# Patient Record
Sex: MALE | Race: WHITE | URBAN - METROPOLITAN AREA
[De-identification: names, ages, dates, MRNs, and addresses within clinical notes are randomized per-mention and may not be internally consistent; named-entity substitution may affect disease eponyms.]

---

## 2023-08-23 ENCOUNTER — INPATIENT (INPATIENT)
Facility: HOSPITAL | Age: 63
LOS: 3 days | Discharge: HOME | DRG: 301 | End: 2023-08-27
Attending: HOSPITALIST | Admitting: EMERGENCY MEDICINE
Payer: COMMERCIAL

## 2023-08-23 VITALS
SYSTOLIC BLOOD PRESSURE: 163 MMHG | DIASTOLIC BLOOD PRESSURE: 99 MMHG | OXYGEN SATURATION: 96 % | TEMPERATURE: 98 F | WEIGHT: 149.91 LBS | RESPIRATION RATE: 17 BRPM | HEART RATE: 80 BPM

## 2023-08-23 PROCEDURE — 99223 1ST HOSP IP/OBS HIGH 75: CPT

## 2023-08-23 PROCEDURE — 99285 EMERGENCY DEPT VISIT HI MDM: CPT | Mod: 25

## 2023-08-23 RX ORDER — ONDANSETRON 8 MG/1
4 TABLET, FILM COATED ORAL EVERY 8 HOURS
Refills: 0 | Status: DISCONTINUED | OUTPATIENT
Start: 2023-08-23 | End: 2023-08-27

## 2023-08-23 RX ORDER — ACETAMINOPHEN 500 MG
650 TABLET ORAL EVERY 6 HOURS
Refills: 0 | Status: DISCONTINUED | OUTPATIENT
Start: 2023-08-23 | End: 2023-08-27

## 2023-08-23 RX ORDER — SODIUM CHLORIDE 9 MG/ML
1000 INJECTION, SOLUTION INTRAVENOUS
Refills: 0 | Status: DISCONTINUED | OUTPATIENT
Start: 2023-08-23 | End: 2023-08-25

## 2023-08-23 RX ORDER — METOCLOPRAMIDE HCL 10 MG
10 TABLET ORAL ONCE
Refills: 0 | Status: COMPLETED | OUTPATIENT
Start: 2023-08-23 | End: 2023-08-23

## 2023-08-23 RX ORDER — LANOLIN ALCOHOL/MO/W.PET/CERES
3 CREAM (GRAM) TOPICAL AT BEDTIME
Refills: 0 | Status: DISCONTINUED | OUTPATIENT
Start: 2023-08-23 | End: 2023-08-27

## 2023-08-23 RX ORDER — ACETAMINOPHEN 500 MG
1000 TABLET ORAL ONCE
Refills: 0 | Status: COMPLETED | OUTPATIENT
Start: 2023-08-23 | End: 2023-08-23

## 2023-08-23 RX ADMIN — Medication 400 MILLIGRAM(S): at 23:41

## 2023-08-23 RX ADMIN — Medication 104 MILLIGRAM(S): at 23:41

## 2023-08-23 NOTE — ED ADULT NURSE NOTE - CHIEF COMPLAINT QUOTE
transfer from [place] with internal carotid dissection. c/o headache x 1 month and blurry vision x 2 months. received ASA and plavix from OSH transfer from [place] with internal carotid dissection. c/o headache x 1 month and blurry vision x 2 months. received ASA and plavix from OSH transfer from [place] with internal carotid dissection. c/o headache x 1 month and blurry vision x 2 months. received ASA and plavix from OSH

## 2023-08-23 NOTE — CHART NOTE - NSCHARTNOTEFT_GEN_A_CORE
64 YO M was accepted for transfer from Health system for observation. Pt was found to have right internal carotid artery dissection. He's initial complaint was dizziness. He reported onset of symptoms over for 3-4 weeks. He is in the rehab program for ETOH and Marijuana, reported last drink was 9 days ago. In [facility] he underwent MRI brain which did not show any acute strokes. Pt's reported NIHSS is 0. He was loaded with ASA 325mg and Plavix 300mg in the [place] ED.  - recommend admission to stepdown with Q2 neurochecks, if any neurological deterioration please contact stroke neurology  - continue ASA 81mg and Plavix 75mg daily  full consult to follow 64 YO M was accepted for transfer from [facility] for observation. Pt was found to have right internal carotid artery dissection. He's initial complaint was dizziness. He reported onset of symptoms over for 3-4 weeks. He is in the rehab program for ETOH and Marijuana, reported last drink was 9 days ago. In [facility] he underwent MRI brain which did not show any acute strokes. Pt's reported NIHSS is 0. He was loaded with ASA 325mg and Plavix 300mg in the [place] ED.  - recommend admission to stepdown with Q2 neurochecks, if any neurological deterioration please contact stroke neurology  - continue ASA 81mg and Plavix 75mg daily  full consult to follow 64 YO M was accepted for transfer from [facility] for observation. Pt was found to have right internal carotid artery dissection. He's initial complaint was dizziness. He reported onset of symptoms over for 3-4 weeks. He is in the rehab program for ETOH and Marijuana, reported last drink was 9 days ago. In [facility] he underwent MRI brain which did not show any acute strokes. Pt's reported NIHSS is 0. He was loaded with ASA 325mg and Plavix 300mg in the [place] ED.  - recommend admission to stepdown with Q2 neurochecks, if any neurological deterioration please contact stroke neurology  - continue ASA 81mg and Plavix 75mg daily  full consult to follow

## 2023-08-23 NOTE — ED ADULT TRIAGE NOTE - CHIEF COMPLAINT QUOTE
transfer from [place] with internal carotid dissection. c/o headache x 1 month and blurry vision x 2 months. received ASA and plavix from OSH transfer from [place] with internal carotid dissection. c/o headache x 1 month and blurry vision x 2 months. received ASA and plavix from OSH transfer from [place] with internal carotid dissection. c/o headache x 1 month and blurry vision x 2 months. received ASA and plavix from OSH

## 2023-08-23 NOTE — ED ADULT TRIAGE NOTE - NSWEIGHTCALCTOOLDRUG_GEN_A_CORE
----- Message from Dinesh Ballard MD sent at 7/12/2018  9:28 AM CDT -----  Please call and schedule patient to see me next week.    Thanks,  Dr. Ballard       used

## 2023-08-23 NOTE — ED PROVIDER NOTE - CLINICAL SUMMARY MEDICAL DECISION MAKING FREE TEXT BOX
63y male w/ pmh of anxiety transferred from [place] for an internal carotid dissection. Patient has been having a right sided migraine for the past month. It is associated with disequilibrium and lightheadedness. Patient denies LOC or falls, denies weakness, numbness, vision problems, or speech difficulties. He also noticed right eyelid drooping two weeks ago. He is currently in a detox program for alcohol and marijuana. His last drink was 9 days ago. Denies any drug use. 63y male w/ pmh of anxiety transferred from [place] for an internal carotid dissection. Patient has been having a right sided migraine for the past month. It is associated with disequilibrium and lightheadedness. Patient denies LOC or falls, denies weakness, numbness, vision problems, or speech difficulties. He also noticed right eyelid drooping two weeks ago. He is currently in a detox program for alcohol and marijuana. His last drink was 9 days ago. Denies any drug use. 63y male w/ pmh of anxiety transferred from [place] for an internal carotid dissection. Patient has been having a right sided migraine for the past month. It is associated with disequilibrium and lightheadedness. Patient denies LOC or falls, denies weakness, numbness, vision problems, or speech difficulties. He also noticed right eyelid drooping two weeks ago. He is currently in a detox program for alcohol and marijuana. His last drink was 9 days ago. Denies any drug use. 63y male w/ pmh of anxiety transferred from [place] for an internal carotid dissection. Patient has been having a right sided migraine for the past month. It is associated with disequilibrium and lightheadedness. Patient denies LOC or falls, denies weakness, numbness, vision problems, or speech difficulties. He also noticed right eyelid drooping two weeks ago. He is currently in a detox program for alcohol and marijuana. His last drink was 9 days ago. Denies any drug use. Case d/w Dr. Bright and requesting pt be admitted to with q2h neuro checks.  Case d/w Hospitalist and will admit 63y male w/ pmh of anxiety transferred from [place] for an internal carotid dissection. Patient has been having a right sided migraine for the past month. It is associated with disequilibrium and lightheadedness. Patient denies LOC or falls, denies weakness, numbness, vision problems, or speech difficulties. He also noticed right eyelid drooping two weeks ago. He is currently in a detox program for alcohol and marijuana. His last drink was 9 days ago. Denies any drug use. Case d/w Dr. Bright and requesting pt be admitted to with q2h neuro checks.  Case d/w Hospitalist and will admit 63y male w/ pmh of anxiety transferred from [place] for an internal carotid dissection. Patient has been having a right sided migraine for the past month. It is associated with disequilibrium and lightheadedness. Patient denies LOC or falls, denies weakness, numbness, vision problems, or speech difficulties. He also noticed right eyelid drooping two weeks ago. He is currently in a detox program for alcohol and marijuana. His last drink was 9 days ago. Denies any drug use. Case d/w Dr. Bright and requesting pt be admitted to with q2h neuro checks.  Case d/w Hospitalist and will admit

## 2023-08-23 NOTE — H&P ADULT - ASSESSMENT
ASSESSMENT:  63M with PMHX MDD, Anxiety, ?Bipolar, BPH, Tobacco/THC Abuse, ETOH Abuse s/p [facility] x9 days presents to [facility] c/o HA and Blurry Vision admitted for R ICA Dissection also found to have episode of Hypoglycemia and Epigastric Pain/Dilated Pancreatic Duct.    PLAN:  R ICA Dissection  -NIHSS 0  -Admit to SDU  -Neurochecks q2  -Repeat Labs  -IVF LR 75cc/hr  -Goal SBP <140   -Hydralazine 5mg IV PRN SBP >140. Avoid BB with Bradycardia.  -ASA 325mg PO and Plavix 300mg x1 Loaded at [facility]  -Continue ASA 81mg q24  -Plavix 75mg q24  -No need for heparin gtt given symptoms >1 month duration per Neuro IR   -NPO pending Neuro IR eval  -VTE PPX SCD/SQH  -Neuro IR consulted appreciate reccs re-eval in AM    Bradycardia  -Monitor Telemetry. Check EKG.  -Hydralazine 5mg PRN SBP >140  -Avoid labetalol with bradycardia  -BP stable. Asymptomatic. Monitor VS q2    Hypoglycemia  -One episode at [facility] resolved PTA  -Accuchecks q4    Incidental Dilated Pancreatic Duct r/o Pancreatitis v Underlying Pathology  -Seen on CTAP from [facility] 8/23  -Check LFTs/Lipase  -Defer MRCP to GI  -GI Consulted    Incidental Nodular Opacity  -1.1cm nodular opactiy on ct Abd/Pel  -Outpatient F/u for repeat CT Chest on discharge    MDD, Anxiety, ?Bipolar Disorder  -Hydroxyzine 50mg BID PRN Anxiety  -Quetiapine 25mg qAM + 100mg qPM  -Trazodone 50mg qHS    BPH  -Flomax 0.4mg qHS    ETOH Abuse, THC Abuse, Tobacco Abuse  -Last drink 9 days ago  -s/p [facility]. Unclear if patient was on treatment taper.  -No signs/symptoms of withdrawal at this time. Monitor closely in case repeat CIWA protocol required.  - Cessation ASSESSMENT:  63M with PMHX MDD, Anxiety, ?Bipolar, BPH, Tobacco/THC Abuse, ETOH Abuse s/p [facility] x9 days [place] [facility] [place] and Blurry Vision admitted for R ICA Dissection also found to have episode of Hypoglycemia and Epigastric Pain/Dilated Pancreatic Duct.    PLAN:  R ICA Dissection  -NIHSS 0  -Admit to SDU  -Neurochecks q2  -Repeat Labs  -IVF LR 75cc/hr  -Goal SBP <140   -Hydralazine 5mg IV PRN SBP >140. Avoid BB with Bradycardia.  -ASA 325mg PO and Plavix 300mg x1 [place] [facility]  -Continue ASA 81mg q24  -Plavix 75mg q24  -No need for heparin gtt given symptoms >1 month duration per Neuro IR   -NPO pending Neuro IR eval  -VTE PPX SCD/SQH  -Neuro IR consulted appreciate reccs re-eval in AM    Bradycardia  -Monitor Telemetry. Check EKG.  -Hydralazine 5mg PRN SBP >140  -Avoid labetalol with bradycardia  -BP stable. Asymptomatic. Monitor VS q2    Hypoglycemia  -One episode [place] [facility] [place]TA  -Accuchecks q4    Incidental Dilated Pancreatic Duct r/o Pancreatitis v Underlying Pathology  -Seen [place] [facility] [place]  -Check LFTs/Lipase  -Defer MRCP to GI  -GI Consulted    Incidental Nodular Opacity  -1.1cm nodular opactiy on ct Abd/Pel  -Outpatient F/u for repeat CT Chest on discharge    MDD, Anxiety, ?Bipolar Disorder  -Hydroxyzine 50mg BID PRN Anxiety  -Quetiapine 25mg qAM + 100mg qPM  -Trazodone 50mg qHS    BPH  -Flomax 0.4mg qHS    ETOH Abuse, THC Abuse, Tobacco Abuse  -Last drink 9 days ago  -s/p [facility]. Unclear if patient was on treatment taper.  -No signs/symptoms of withdrawal at this time. Monitor closely in case repeat CIWA protocol required.  - Cessation ASSESSMENT:  63M with PMHX MDD, Anxiety, ?Bipolar, BPH, Tobacco/THC Abuse, ETOH Abuse s/p [facility] x9 days presents to [facility] c/o HA and Blurry Vision admitted for R ICA Dissection also found to have episode of Hypoglycemia and Epigastric Pain/Dilated Pancreatic Duct.    PLAN:  R ICA Dissection  -NIHSS 0  -Admit to SDU  -Neurochecks q2  -Repeat Labs  -IVF LR 75cc/hr  -Goal SBP <140   -Hydralazine 5mg IV PRN SBP >140. Avoid BB with Bradycardia.  -ASA 325mg PO and Plavix 300mg x1 Loaded at [facility]  -Continue ASA 81mg q24  -Plavix 75mg q24  -No need for heparin gtt given symptoms >1 month duration per Neuro IR   -NPO pending Neuro IR eval  -VTE PPX SCD/SQH  -Neuro IR consulted appreciate reccs re-eval in AM    Bradycardia  -Monitor Telemetry. Check EKG.  -Hydralazine 5mg PRN SBP >140  -Avoid labetalol with bradycardia  -BP stable. Asymptomatic. Monitor VS q2    Hypoglycemia  -One episode at [facility] resolved PTA  -Accuchecks q4    Incidental Dilated Pancreatic Duct r/o Pancreatitis v Underlying Pathology  -Seen on CTAP from [facility] 8/23  -Check LFTs/Lipase  -Defer MRCP to GI  -GI Consulted    Incidental Nodular Opacity  -1.1cm nodular opactiy on ct Abd/Pel  -Outpatient F/u for repeat CT Chest on discharge    MDD, Anxiety, ?Bipolar Disorder  -Hydroxyzine 50mg BID PRN Anxiety  -Quetiapine 25mg qAM + 100mg qPM  -Trazodone 50mg qHS    BPH  -Flomax 0.4mg qHS    ETOH Abuse, THC Abuse, Tobacco Abuse  -Last drink 9 days ago  -s/p [facility]. Unclear if patient was on treatment taper.  -No signs/symptoms of withdrawal at this time. Monitor closely in case repeat CIWA protocol required.  - Cessation

## 2023-08-23 NOTE — ED ADULT TRIAGE NOTE - NS ED NURSE AMBULANCES
[facility]

## 2023-08-23 NOTE — H&P ADULT - NSHPPHYSICALEXAM_GEN_ALL_CORE
Vital Signs Last 24 Hrs  T(C): 36.8 (23 Aug 2023 22:35), Max: 36.8 (23 Aug 2023 22:35)  T(F): 98.2 (23 Aug 2023 22:35), Max: 98.2 (23 Aug 2023 22:35)  HR: 56 (24 Aug 2023 00:22) (49 - 80)  BP: 133/93 (24 Aug 2023 00:22) (133/93 - 163/99)  BP(mean): --  RR: 16 (23 Aug 2023 23:08) (16 - 17)  SpO2: 96% (23 Aug 2023 23:08) (96% - 96%)    Parameters below as of 23 Aug 2023 23:08  Patient On (Oxygen Delivery Method): room air    Constitutional: Well-appearing, NAD, VSS  Head: NC/AT  Eyes: PERRL, EOMI, anicteric sclera, conjunctiva WNL  ENT: Normal Pharynx, MMM, No tonsillar exudate/erythema  Neck: Supple, Non-tender  Chest: Non-tender, no rashes  Cardio: +Bradycardia, s1/s2, no appreciable murmurs/rubs/gallops  Resp: BS CTA bilaterally, no wheezing/rhonchi/rales  Abd: Soft, Non-tender, Non-distended, no rebound/guarding/rigidity  : not examined  Rectal: not examined  MSK: moving all extremities, no motor weakness, full ROM x4  Ext: palpable distal pulses, good capillary refill, no clubbing/cyanosis/edema  Psych: appropriate, cooperative  Neuro: CN II-XII grossly intact, no focal deficits, NIHSS 0  Skin: Warm/Dry. No rashes.

## 2023-08-23 NOTE — ED PROVIDER NOTE - PHYSICAL EXAMINATION
General: Awake, alert, lying in bed in NAD  HEENT: Normocephalic, atraumatic. No scleral icterus or conjunctival injection. EOMI. Moist mucous membranes. Oropharynx clear.   Neck:. Soft and supple.  Cardiac: RRR, Peripheral pulses 2+ and symmetric. No LE edema.  Resp: Lungs CTAB. No accessory muscle use  Abd: Soft, non-tender, non-distended. No guarding, rebound, or rigidity.  Back: Spine midline and non-tender.   Skin: No rashes, abrasions, or lacerations.  Neuro: AO x 4. Moves all extremities symmetrically. Motor strength and sensation grossly intact. CN II-XII intact. 5/5 strength in all extremities. No dysmetria on finger to nose. right sided ptosis.   Psych: Appropriate mood and affect

## 2023-08-23 NOTE — ED PROVIDER NOTE - ATTENDING CONTRIBUTION TO CARE
62 yo M [place] [facility] [place] with c/o R sided headache for a month with assoc dizziness.  CTA demonstrated a right ICA dissection and pt was sent to [facility] for eval by Neuro IR/Dr. Bright. Pt was started on ASA and Plavix PTA.  In ED pt awake and alert, thin appearing in NAD, PERRL, EOMI, mm moist, Neck supple, Cor Reg, Lungs clear b/l, abd soft, NT, Ext FROM, Neuro CN 2-12 intact MS 5/5 UE/LE's.  Case d/w Dr. Bright and requesting pt be admitted to with q2h neuro checks.  Case d/w Hospitalist and will admit 62 yo M [facility] after presenting with c/o R sided headache for a month with assoc dizziness.  CTA demonstrated a right ICA dissection and pt was sent to [facility] for eval by Neuro IR/Dr. Bright. Pt was started on ASA and Plavix PTA.  In ED pt awake and alert, thin appearing in NAD, PERRL, EOMI, mm moist, Neck supple, Cor Reg, Lungs clear b/l, abd soft, NT, Ext FROM, Neuro CN 2-12 intact MS 5/5 UE/LE's.  Case d/w Dr. Bright and requesting pt be admitted to with q2h neuro checks.  Case d/w Hospitalist and will admit 64 yo M transferred from [facility] after presenting with c/o R sided headache for a month with assoc dizziness.  CTA demonstrated a right ICA dissection and pt was sent to [facility] for eval by Neuro IR/Dr. Bright. Pt was started on ASA and Plavix PTA.  In ED pt awake and alert, thin appearing in NAD, PERRL, EOMI, mm moist, Neck supple, Cor Reg, Lungs clear b/l, abd soft, NT, Ext FROM, Neuro CN 2-12 intact MS 5/5 UE/LE's.  Case d/w Dr. Bright and requesting pt be admitted to with q2h neuro checks.  Case d/w Hospitalist and will admit

## 2023-08-23 NOTE — ED PROVIDER NOTE - NS ED ROS FT
General: Denies fever, chills  HEENT: Denies sore throat  Neck: Denies neck pain  Resp: Denies coughing, SOB  Cardiovascular: Denies CP, palpitations, LE edema  GI: Denies nausea, vomiting, abdominal pain, diarrhea, constipation, blood in stool  : Denies dysuria, hematuria  MSK: Denies back pain  Neuro: Denies numbness, weakness  Skin: Denies rashes.

## 2023-08-23 NOTE — H&P ADULT - HISTORY OF PRESENT ILLNESS
Patient seen/examined prior to [facility] on 8/23/23.    63M with PMHX MDD, Anxiety, ?Bipolar, BPH, Tobacco/THC Abuse, ETOH Abuse s/p [facility] x9 days presents to [facility] c/o HA and Blurry Vision. CTH negative. CTA Head/Neck concerning for possible R ICA Dissection. MRI Brain, MRA Head/Neck performed at [facility] confirming R ICA Dissection. [place] [facility] and [place] [facility] for Neuro IR eval. Also noted to have episode of hypoglycemia at [facility] which resolved and was also c/o epigastric abd pain with nausea for which CT ABD/PEL revealed dilated pancreatic duct. No current abd pain or N/V/D. Denies F/C. HA and Blurry vision improving. No weakness/dizziness. [facility] ED and [facility] ED discussed case with [facility] MICU and [facility] Neuro IR. No other complaints.    ROS negative unless mentioned.    PMHX: MDD, ?Bipolar, BPH, Tobacco/THC Abuse, ETOH Abuse   PSHX: None  Social Hx: +ETOH Abuse, THC Abuse, Tobacco Abuse  FamHx: +FamHx Brain Aneurysm reported Patient seen/examined prior to [facility] on 8/23/23.    63M with PMHX MDD, Anxiety, ?Bipolar, BPH, Tobacco/THC Abuse, ETOH Abuse s/p [facility] x9 days presents to [facility] c/o HA and Blurry Vision. CTH negative. CTA Head/Neck concerning for possible R ICA Dissection. MRI Brain, MRA Head/Neck performed at [facility] confirming R ICA Dissection. Patient loaded with ASA/Plavix at [facility] and transferred to [facility] for Neuro IR eval. Also noted to have episode of hypoglycemia at [facility] which resolved and was also c/o epigastric abd pain with nausea for which CT ABD/PEL revealed dilated pancreatic duct. No current abd pain or N/V/D. Denies F/C. HA and Blurry vision improving. No weakness/dizziness. [facility] ED and [facility] ED discussed case with [facility] MICU and [facility] Neuro IR. No other complaints.    ROS negative unless mentioned.    PMHX: MDD, ?Bipolar, BPH, Tobacco/THC Abuse, ETOH Abuse   PSHX: None  Social Hx: +ETOH Abuse, THC Abuse, Tobacco Abuse  FamHx: +FamHx Brain Aneurysm reported Patient seen/examined prior to [facility] on 8/23/23.    63M with PMHX MDD, Anxiety, ?Bipolar, BPH, Tobacco/THC Abuse, ETOH Abuse s/p [facility] x9 days presents to [facility] c/o HA and Blurry Vision. CTH negative. CTA Head/Neck concerning for possible R ICA Dissection. MRI Brain, MRA Head/Neck performed at [facility] confirming R ICA Dissection. Patient loaded with ASA/Plavix at [facility] and transferred to [facility] for Neuro IR eval. Also noted to have episode of hypoglycemia at [facility] which resolved and was also c/o epigastric abd pain with nausea for which CT ABD/PEL revealed dilated pancreatic duct. No current abd pain or N/V/D. Denies F/C. HA and Blurry vision improving. No weakness/dizziness. [facility] ED and [facility] ED discussed case with [facility] MICU and [facility] Neuro IR. No other complaints.    ROS negative unless mentioned.    PMHX: MDD, ?Bipolar, BPH, Tobacco/THC Abuse, ETOH Abuse   PSHX: None  Social Hx: +ETOH Abuse, THC Abuse, Tobacco Abuse  FamHx: +FamHx Brain Aneurysm reported

## 2023-08-23 NOTE — H&P ADULT - NSHPLABSRESULTS_GEN_ALL_CORE
CXR 8/23/23 [facility]   IMPRESSION: Negative chest.    CT ABD/PELVIS 8/23 [facility] IMPRESSION:  Diffuse bladder wall thickening. Correlate with urinalysis for cystitis.  Appendix is not definitely visualized. However, evaluation is markedly limited without oral or intravenous contrast. Repeat CT with oral contrast is recommended if there is clinical concern for acute appendicitis.  Prominence of the pancreas with mild dilatation of the main pancreatic duct. Follow-up MRI/MRCP is recommended.  There is a 1.1 cm nodular opacity at the right lung base. Follow-up dedicated chest CT is recommended for further evaluation.    CT Head WO 8/23/23 [facility]   IMPRESSION: No acute hemorrhage mass or mass effect.    CTA Brain/Neck 8/23 [facility] IMPRESSION:  CTA Neck: Findings most compatible with right cervical ICA dissection extending to the skull base. Recommend further evaluation with MRA of the neck and MRI/MRA of the brain.  CTA Head: No large vessel occlusion, significant stenosis, dissection, or saccular aneurysm.    MRI Brain/MRA Brain/Neck IVC 8/23 [facility] IMPRESSION:  MRI BRAIN:  1. Several small nonspecific foci of T2 increased signal on FLAIR acquisition imaging bilaterally at the level the corona radiata and centrum semiovale ovale, with the most prominent noted on the left at the level the corona radiata, measuring approximately 7.5 mm in width x 4 mm in AP diameter, which may represent a prior infectious, inflammatory, ischemic, or demyelinating process.   No acute intracranial pathology. Specifically, no restricted diffusion. No acute ischemic event.  2. No abnormal intraparenchymal or leptomeningeal enhancement.  3. Mild paranasal sinus inflammatory changes.    MRA NECK AND [place]:  1. Dissection appreciated association with the right internal carotid artery with the proximal extent appreciated just beyond the bulb of the right internal carotid artery. The right internal carotid artery dissection persists distally to the level of the skull base/proximal petrous right internal carotid artery.  2. Findings also include suggestion of short segment stenosis involving an M2 branch of the right middle cerebral artery.  3. No evidence of aneurysm formation at the level of the [place].  4. Fetal origin of the left posterior cerebral artery.  5. Prominent right posterior communicating artery contributes to flow within the right posterior cerebral artery.  6. No evidence of aneurysm formation at the level of the [place]. CXR 8/23/23 [facility]   IMPRESSION: Negative chest.    CT ABD/PELVIS 8/23 [facility] IMPRESSION:  Diffuse bladder wall thickening. Correlate with urinalysis for cystitis.  Appendix is not definitely visualized. However, evaluation is markedly limited without oral or intravenous contrast. Repeat CT with oral contrast is recommended if there is clinical concern for acute appendicitis.  Prominence of the pancreas with mild dilatation of the main pancreatic duct. Follow-up MRI/MRCP is recommended.  There is a 1.1 cm nodular opacity at the right lung base. Follow-up dedicated chest CT is recommended for further evaluation.    CT Head WO 8/23/23 [facility]   IMPRESSION: No acute hemorrhage mass or mass effect.    CTA Brain/Neck 8/23 [facility] IMPRESSION:  CTA Neck: Findings most compatible with right cervical ICA dissection extending to the skull base. Recommend further evaluation with MRA of the neck and MRI/MRA of the brain.  CTA Head: No large vessel occlusion, significant stenosis, dissection, or saccular aneurysm.    MRI Brain/MRA Brain/Neck IVC 8/23 [facility] IMPRESSION:  MRI BRAIN:  1. Several small nonspecific foci of T2 increased signal on FLAIR acquisition imaging bilaterally at the level the corona radiata and centrum semiovale ovale, with the most prominent noted on the left at the level the corona radiata, measuring approximately 7.5 mm in width x 4 mm in AP diameter, which may represent a prior infectious, inflammatory, ischemic, or demyelinating process.   No acute intracranial pathology. Specifically, no restricted diffusion. No acute ischemic event.  2. No abnormal intraparenchymal or leptomeningeal enhancement.  3. Mild paranasal sinus inflammatory changes.    MRA NECK AND [place]:  1. Dissection appreciated association with the right internal carotid artery with the proximal extent appreciated just beyond the bulb of the right internal carotid artery. The right internal carotid artery dissection persists distally to the level of the skull base/proximal petrous right internal carotid artery.  2. Findings also include suggestion of short segment stenosis involving an M2 branch of the right middle cerebral artery.  3. No evidence of aneurysm formation at the level of the [place].  4. Fetal origin of the left posterior cerebral artery.  5. Prominent right posterior communicating artery contributes to flow within the right posterior cerebral artery.  6. No evidence of aneurysm formation at the level of the [place]. CXR 8/23/23 [facility]   IMPRESSION: Negative chest.    CT ABD/PELVIS 8/23 [facility] IMPRESSION:  Diffuse bladder wall thickening. Correlate with urinalysis for cystitis.  Appendix is not definitely visualized. However, evaluation is markedly limited without oral or intravenous contrast. Repeat CT with oral contrast is recommended if there is clinical concern for acute appendicitis.  Prominence of the pancreas with mild dilatation of the main pancreatic duct. Follow-up MRI/MRCP is recommended.  There is a 1.1 cm nodular opacity at the right lung base. Follow-up dedicated chest CT is recommended for further evaluation.    CT Head WO 8/23/23 [facility]   IMPRESSION: No acute hemorrhage mass or mass effect.    CTA Brain/Neck 8/23 [facility] IMPRESSION:  CTA Neck: Findings most compatible with right cervical ICA dissection extending to the skull base. Recommend further evaluation with MRA of the neck and MRI/MRA of the brain.  CTA Head: No large vessel occlusion, significant stenosis, dissection, or saccular aneurysm.    MRI Brain/MRA Brain/Neck IVC 8/23 [facility] IMPRESSION:  MRI BRAIN:  1. Several small nonspecific foci of T2 increased signal on FLAIR acquisition imaging bilaterally at the level the corona radiata and centrum semiovale ovale, with the most prominent noted on the left at the level the corona radiata, measuring approximately 7.5 mm in width x 4 mm in AP diameter, which may represent a prior infectious, inflammatory, ischemic, or demyelinating process.   No acute intracranial pathology. Specifically, no restricted diffusion. No acute ischemic event.  2. No abnormal intraparenchymal or leptomeningeal enhancement.  3. Mild paranasal sinus inflammatory changes.    MRA NECK AND [place]:  1. Dissection appreciated association with the right internal carotid artery with the proximal extent appreciated just beyond the bulb of the right internal carotid artery. The right internal carotid artery dissection persists distally to the level of the skull base/proximal petrous right internal carotid artery.  2. Findings also include suggestion of short segment stenosis involving an M2 branch of the right middle cerebral artery.  3. No evidence of aneurysm formation at the level of the [place].  4. Fetal origin of the left posterior cerebral artery.  5. Prominent right posterior communicating artery contributes to flow within the right posterior cerebral artery.  6. No evidence of aneurysm formation at the level of the [place].

## 2023-08-23 NOTE — ED ADULT NURSE NOTE - OBJECTIVE STATEMENT
Pt transfer from [place] for internal carotid dissection. Pt c/o migraine that started 1 month ago. Pt was at [facility] for alcohol detox for past 10 days. Pt denies N/V. Pt is AOx3, alert and calm. Airway patent, respirations even unlabored. VSS. Pt connected to cardiac monitor and pulse ox. Pt transfer from [place] for internal carotid dissection. Pt c/o migraine that started 1 month ago. Pt was at [facility] for alcohol detox for past 10 days. Pt denies N/V. Pt is AOx3, alert and calm. Airway patent, respirations even unlabored. VSS. Pt connected to cardiac monitor and pulse ox. Pt transfer from [place] for internal carotid dissection. Pt c/o migraine that started 1 month ago. Pt was at [facility] for alcohol detox for past 10 days. Pt denies N/V. Pt is AOx3, alert and calm. Airway patent, respirations even unlabored. VSS. Pt connected to cardiac monitor and pulse ox.

## 2023-08-23 NOTE — ED ADULT NURSE NOTE - NSSEPSISSUSPECTED_ED_A_ED
CC:  Rebecca Ambriz is here today for Physical      Medications: medications verified, no change  Refills needed today?  NO  denies Latex allergy or sensitivity  Patient would like communication of their results via:    Cell Phone:   Telephone Information:   Mobile [phone]     Okay to leave a message containing results? Yes  Tobacco history: verified  Advanced directives: No        Patient's current [facility] status: Active.  Health Maintenance Due   Topic Date Due   • Shingles Vaccine (2 of 3) 12/11/2013   • Influenza Vaccine (1) 09/01/2020     Patient is due for the topics as listed above and wishes to discuss treatment options with provider.          [facility] status addressed. Patient Active.         No

## 2023-08-23 NOTE — ED ADULT NURSE NOTE - NSFALLUNIVINTERV_ED_ALL_ED
Bed/Stretcher in lowest position, wheels locked, appropriate side rails in place/Call bell, personal items and telephone in reach/Instruct patient to call for assistance before getting out of bed/chair/stretcher/Non-slip footwear applied when patient is off stretcher/[place] to call system/Physically safe environment - no spills, clutter or unnecessary equipment/Purposeful proactive rounding/Room/bathroom lighting operational, light cord in reach Bed/Stretcher in lowest position, wheels locked, appropriate side rails in place/Call bell, personal items and telephone in reach/Instruct patient to call for assistance before getting out of bed/chair/stretcher/Non-slip footwear applied when patient is off stretcher/[place] to call system/Physically safe environment - no spills, clutter or unnecessary equipment/Purposeful proactive rounding/Room/bathroom lighting operational, light cord in reach Bed/Stretcher in lowest position, wheels locked, appropriate side rails in place/Call bell, personal items and telephone in reach/Instruct patient to call for assistance before getting out of bed/chair/stretcher/Non-slip footwear applied when patient is off stretcher/[place] to call system/Physically safe environment - no spills, clutter or unnecessary equipment/Purposeful proactive rounding/Room/bathroom lighting operational, light cord in reach

## 2023-08-23 NOTE — ED PROVIDER NOTE - OBJECTIVE STATEMENT
63y male 63y male w/ pmh of anxiety transferred from [place] for an internal carotid dissection. Patient has been having a right sided migraine for the past month. It is associated with disequilibrium and lightheadedness. Patient denies LOC or falls, denies weakness, numbness, vision problems, or speech difficulties. He also noticed right eyelid drooping two weeks ago. He is currently in a detox program for alcohol and marijuana. His last drink was 9 days ago. Denies any drug use. 63y male w/ pmh of anxiety transferred from [place] for an internal carotid dissection. Patient has been having a right sided migraine for the past month. It is associated with disequilibrium and lightheadedness. Patient denies LOC or falls, denies weakness, numbness, vision problems, or speech difficulties. He also noticed right eyelid drooping two weeks ago. He is currently in a detox program for alcohol and marijuana. His last drink was 9 days ago. Denies any drug use. 63y male w/ pmh of anxiety transferred from [place] for an internal carotid dissection. Patient has been having a right sided migraine for the past month. It is associated with disequilibrium and lightheadedness. Patient denies LOC or falls, denies weakness, numbness, vision problems, or speech difficulties. He also noticed right eyelid drooping two weeks ago. He is currently in a detox program for alcohol and marijuana. His last drink was 9 days ago. Denies any drug use.

## 2023-08-24 DIAGNOSIS — I77.71 DISSECTION OF CAROTID ARTERY: ICD-10-CM

## 2023-08-24 LAB
ALBUMIN SERPL ELPH-MCNC: 3.5 G/DL — SIGNIFICANT CHANGE UP (ref 3.3–5.2)
ALP SERPL-CCNC: 53 U/L — SIGNIFICANT CHANGE UP (ref 40–120)
ALT FLD-CCNC: 22 U/L — SIGNIFICANT CHANGE UP
ANION GAP SERPL CALC-SCNC: 10 MMOL/L — SIGNIFICANT CHANGE UP (ref 5–17)
AST SERPL-CCNC: 24 U/L — SIGNIFICANT CHANGE UP
BASOPHILS # BLD AUTO: 0.02 K/UL — SIGNIFICANT CHANGE UP (ref 0–0.2)
BASOPHILS NFR BLD AUTO: 0.4 % — SIGNIFICANT CHANGE UP (ref 0–2)
BILIRUB SERPL-MCNC: 0.8 MG/DL — SIGNIFICANT CHANGE UP (ref 0.4–2)
BUN SERPL-MCNC: 12.1 MG/DL — SIGNIFICANT CHANGE UP (ref 8–20)
CALCIUM SERPL-MCNC: 9 MG/DL — SIGNIFICANT CHANGE UP (ref 8.4–10.5)
CHLORIDE SERPL-SCNC: 106 MMOL/L — SIGNIFICANT CHANGE UP (ref 96–108)
CO2 SERPL-SCNC: 24 MMOL/L — SIGNIFICANT CHANGE UP (ref 22–29)
CREAT SERPL-MCNC: 0.88 MG/DL — SIGNIFICANT CHANGE UP (ref 0.5–1.3)
EGFR: 95 ML/MIN/1.73M2 — SIGNIFICANT CHANGE UP
EOSINOPHIL # BLD AUTO: 0.1 K/UL — SIGNIFICANT CHANGE UP (ref 0–0.5)
EOSINOPHIL NFR BLD AUTO: 2.2 % — SIGNIFICANT CHANGE UP (ref 0–6)
GLUCOSE BLDC GLUCOMTR-MCNC: 116 MG/DL — HIGH (ref 70–99)
GLUCOSE BLDC GLUCOMTR-MCNC: 137 MG/DL — HIGH (ref 70–99)
GLUCOSE BLDC GLUCOMTR-MCNC: 74 MG/DL — SIGNIFICANT CHANGE UP (ref 70–99)
GLUCOSE BLDC GLUCOMTR-MCNC: 75 MG/DL — SIGNIFICANT CHANGE UP (ref 70–99)
GLUCOSE BLDC GLUCOMTR-MCNC: 85 MG/DL — SIGNIFICANT CHANGE UP (ref 70–99)
GLUCOSE BLDC GLUCOMTR-MCNC: 86 MG/DL — SIGNIFICANT CHANGE UP (ref 70–99)
GLUCOSE SERPL-MCNC: 76 MG/DL — SIGNIFICANT CHANGE UP (ref 70–99)
HCT VFR BLD CALC: 44 % — SIGNIFICANT CHANGE UP (ref 39–50)
HCV AB S/CO SERPL IA: 0.31 S/CO — SIGNIFICANT CHANGE UP (ref 0–0.99)
HCV AB SERPL-IMP: SIGNIFICANT CHANGE UP
HGB BLD-MCNC: 14.8 G/DL — SIGNIFICANT CHANGE UP (ref 13–17)
IMM GRANULOCYTES NFR BLD AUTO: 0.4 % — SIGNIFICANT CHANGE UP (ref 0–0.9)
LIDOCAIN IGE QN: 38 U/L — SIGNIFICANT CHANGE UP (ref 22–51)
LYMPHOCYTES # BLD AUTO: 1.4 K/UL — SIGNIFICANT CHANGE UP (ref 1–3.3)
LYMPHOCYTES # BLD AUTO: 31.5 % — SIGNIFICANT CHANGE UP (ref 13–44)
MAGNESIUM SERPL-MCNC: 1.9 MG/DL — SIGNIFICANT CHANGE UP (ref 1.8–2.6)
MCHC RBC-ENTMCNC: 31 PG — SIGNIFICANT CHANGE UP (ref 27–34)
MCHC RBC-ENTMCNC: 33.6 GM/DL — SIGNIFICANT CHANGE UP (ref 32–36)
MCV RBC AUTO: 92.1 FL — SIGNIFICANT CHANGE UP (ref 80–100)
MONOCYTES # BLD AUTO: 0.59 K/UL — SIGNIFICANT CHANGE UP (ref 0–0.9)
MONOCYTES NFR BLD AUTO: 13.3 % — SIGNIFICANT CHANGE UP (ref 2–14)
NEUTROPHILS # BLD AUTO: 2.32 K/UL — SIGNIFICANT CHANGE UP (ref 1.8–7.4)
NEUTROPHILS NFR BLD AUTO: 52.2 % — SIGNIFICANT CHANGE UP (ref 43–77)
PHOSPHATE SERPL-MCNC: 3.2 MG/DL — SIGNIFICANT CHANGE UP (ref 2.4–4.7)
PLATELET # BLD AUTO: 176 K/UL — SIGNIFICANT CHANGE UP (ref 150–400)
POTASSIUM SERPL-MCNC: 4.2 MMOL/L — SIGNIFICANT CHANGE UP (ref 3.5–5.3)
POTASSIUM SERPL-SCNC: 4.2 MMOL/L — SIGNIFICANT CHANGE UP (ref 3.5–5.3)
PROT SERPL-MCNC: 6 G/DL — LOW (ref 6.6–8.7)
RBC # BLD: 4.78 M/UL — SIGNIFICANT CHANGE UP (ref 4.2–5.8)
RBC # FLD: 13.3 % — SIGNIFICANT CHANGE UP (ref 10.3–14.5)
SODIUM SERPL-SCNC: 140 MMOL/L — SIGNIFICANT CHANGE UP (ref 135–145)
WBC # BLD: 4.45 K/UL — SIGNIFICANT CHANGE UP (ref 3.8–10.5)
WBC # FLD AUTO: 4.45 K/UL — SIGNIFICANT CHANGE UP (ref 3.8–10.5)

## 2023-08-24 PROCEDURE — 99223 1ST HOSP IP/OBS HIGH 75: CPT

## 2023-08-24 PROCEDURE — 99232 SBSQ HOSP IP/OBS MODERATE 35: CPT

## 2023-08-24 PROCEDURE — 93892 TCD EMBOLI DETECT W/O INJ: CPT | Mod: 26

## 2023-08-24 RX ORDER — HYDROXYZINE HCL 10 MG
50 TABLET ORAL
Refills: 0 | Status: DISCONTINUED | OUTPATIENT
Start: 2023-08-24 | End: 2023-08-27

## 2023-08-24 RX ORDER — HYDRALAZINE HCL 50 MG
5 TABLET ORAL EVERY 4 HOURS
Refills: 0 | Status: DISCONTINUED | OUTPATIENT
Start: 2023-08-24 | End: 2023-08-27

## 2023-08-24 RX ORDER — TRAZODONE HCL 50 MG
50 TABLET ORAL AT BEDTIME
Refills: 0 | Status: DISCONTINUED | OUTPATIENT
Start: 2023-08-24 | End: 2023-08-27

## 2023-08-24 RX ORDER — FAMOTIDINE 10 MG/ML
20 INJECTION INTRAVENOUS
Refills: 0 | Status: DISCONTINUED | OUTPATIENT
Start: 2023-08-24 | End: 2023-08-27

## 2023-08-24 RX ORDER — TAMSULOSIN HYDROCHLORIDE 0.4 MG/1
0.4 CAPSULE ORAL AT BEDTIME
Refills: 0 | Status: DISCONTINUED | OUTPATIENT
Start: 2023-08-24 | End: 2023-08-27

## 2023-08-24 RX ORDER — HEPARIN SODIUM 5000 [USP'U]/ML
5000 INJECTION INTRAVENOUS; SUBCUTANEOUS EVERY 12 HOURS
Refills: 0 | Status: DISCONTINUED | OUTPATIENT
Start: 2023-08-23 | End: 2023-08-27

## 2023-08-24 RX ORDER — CLOPIDOGREL BISULFATE 75 MG/1
75 TABLET, FILM COATED ORAL DAILY
Refills: 0 | Status: DISCONTINUED | OUTPATIENT
Start: 2023-08-24 | End: 2023-08-27

## 2023-08-24 RX ORDER — ASPIRIN/CALCIUM CARB/MAGNESIUM 324 MG
81 TABLET ORAL DAILY
Refills: 0 | Status: DISCONTINUED | OUTPATIENT
Start: 2023-08-24 | End: 2023-08-24

## 2023-08-24 RX ORDER — QUETIAPINE FUMARATE 200 MG/1
25 TABLET, FILM COATED ORAL
Refills: 0 | Status: DISCONTINUED | OUTPATIENT
Start: 2023-08-24 | End: 2023-08-27

## 2023-08-24 RX ORDER — QUETIAPINE FUMARATE 200 MG/1
100 TABLET, FILM COATED ORAL AT BEDTIME
Refills: 0 | Status: DISCONTINUED | OUTPATIENT
Start: 2023-08-24 | End: 2023-08-27

## 2023-08-24 RX ADMIN — TAMSULOSIN HYDROCHLORIDE 0.4 MILLIGRAM(S): 0.4 CAPSULE ORAL at 21:23

## 2023-08-24 RX ADMIN — SODIUM CHLORIDE 75 MILLILITER(S): 9 INJECTION, SOLUTION INTRAVENOUS at 21:23

## 2023-08-24 RX ADMIN — CLOPIDOGREL BISULFATE 75 MILLIGRAM(S): 75 TABLET, FILM COATED ORAL at 12:19

## 2023-08-24 RX ADMIN — QUETIAPINE FUMARATE 25 MILLIGRAM(S): 200 TABLET, FILM COATED ORAL at 05:51

## 2023-08-24 RX ADMIN — Medication 50 MILLIGRAM(S): at 21:23

## 2023-08-24 RX ADMIN — SODIUM CHLORIDE 75 MILLILITER(S): 9 INJECTION, SOLUTION INTRAVENOUS at 05:52

## 2023-08-24 RX ADMIN — SODIUM CHLORIDE 75 MILLILITER(S): 9 INJECTION, SOLUTION INTRAVENOUS at 19:34

## 2023-08-24 RX ADMIN — Medication 50 MILLIGRAM(S): at 00:30

## 2023-08-24 RX ADMIN — Medication 1000 MILLIGRAM(S): at 00:20

## 2023-08-24 RX ADMIN — SODIUM CHLORIDE 75 MILLILITER(S): 9 INJECTION, SOLUTION INTRAVENOUS at 00:30

## 2023-08-24 RX ADMIN — HEPARIN SODIUM 5000 UNIT(S): 5000 INJECTION INTRAVENOUS; SUBCUTANEOUS at 18:26

## 2023-08-24 RX ADMIN — HEPARIN SODIUM 5000 UNIT(S): 5000 INJECTION INTRAVENOUS; SUBCUTANEOUS at 05:50

## 2023-08-24 RX ADMIN — QUETIAPINE FUMARATE 100 MILLIGRAM(S): 200 TABLET, FILM COATED ORAL at 21:23

## 2023-08-24 RX ADMIN — FAMOTIDINE 20 MILLIGRAM(S): 10 INJECTION INTRAVENOUS at 18:25

## 2023-08-24 RX ADMIN — QUETIAPINE FUMARATE 100 MILLIGRAM(S): 200 TABLET, FILM COATED ORAL at 00:30

## 2023-08-24 NOTE — CONSULT NOTE ADULT - SUBJECTIVE AND OBJECTIVE BOX
HISTORY OF PRESENT ILLNESS: This is a 63y old man with a past medical history significant for polysubstance abuse who was transferred for evaluation of R ICA dissection after he reported daily headaches at rehab. GI was consulted for incidental finding of dilated PD.   He denies weight loss, nausea, vomiting, abdominal pain or diarrhea. He denies previous history of pancreatitis. He denies family history of pancreas cancer. He reports chronic tobacco use and daily alcohol use.   He reports resolution of headache and dizziness today.  ROS: A 14-point review of systems was completed and was otherwise negative save what was reported in the HPI.    PAST MEDICAL/SURGICAL HISTORY:  Anxiety      SOCIAL HISTORY:  - TOBACCO: ++  - ALCOHOL: ++  - ILLICIT DRUG USE: marijuana history of cocaine     FAMILY HISTORY:  No known history of gastrointestinal or liver disease;      HOME MEDICATIONS:  hydrOXYzine pamoate 50 mg oral capsule: 1 cap(s) orally 2 times a day as needed for  anxiety (24 Aug 2023 00:25)  QUEtiapine 100 mg oral tablet: 1 tab(s) orally once a day (at bedtime) (24 Aug 2023 00:25)  QUEtiapine 25 mg oral tablet: 1 tab(s) orally once a day (in the morning) (24 Aug 2023 00:25)  tamsulosin 0.4 mg oral capsule: 1 cap(s) orally once a day (at bedtime) (24 Aug 2023 00:25)  traZODone 50 mg oral tablet: 1 tab(s) orally once a day (at bedtime) (24 Aug 2023 00:25)    INPATIENT MEDICATIONS:  MEDICATIONS  (STANDING):  aspirin  chewable 81 milliGRAM(s) Oral daily  clopidogrel Tablet 75 milliGRAM(s) Oral daily  heparin   Injectable 5000 Unit(s) SubCutaneous every 12 hours  lactated ringers. 1000 milliLiter(s) (75 mL/Hr) IV Continuous <Continuous>  QUEtiapine 100 milliGRAM(s) Oral at bedtime  QUEtiapine 25 milliGRAM(s) Oral <User Schedule>  tamsulosin 0.4 milliGRAM(s) Oral at bedtime  traZODone 50 milliGRAM(s) Oral at bedtime    MEDICATIONS  (PRN):  acetaminophen     Tablet .. 650 milliGRAM(s) Oral every 6 hours PRN Temp greater or equal to 38C (100.4F), Mild Pain (1 - 3)  aluminum hydroxide/magnesium hydroxide/simethicone Suspension 30 milliLiter(s) Oral every 4 hours PRN Dyspepsia  hydrALAZINE Injectable 5 milliGRAM(s) IV Push every 4 hours PRN SBP > 140  hydrOXYzine hydrochloride 50 milliGRAM(s) Oral two times a day PRN Anxiety  melatonin 3 milliGRAM(s) Oral at bedtime PRN Insomnia  ondansetron Injectable 4 milliGRAM(s) IV Push every 8 hours PRN Nausea and/or Vomiting    ALLERGIES:  Allergy Status Unknown    T(C): 36.8 (08-23-23 @ 22:35), Max: 36.8 (08-23-23 @ 22:35)  HR: 51 (08-24-23 @ 06:00) (46 - 80)  BP: 121/81 (08-24-23 @ 06:00) (104/58 - 163/99)  RR: 15 (08-24-23 @ 06:00) (15 - 18)  SpO2: 98% (08-24-23 @ 06:00) (94% - 98%)      PHYSICAL EXAM:  Constitutional: thin in no apparent distress  Eyes: Sclerae anicteric, conjunctivae normal  ENMT: Mucus membranes moist, no oropharyngeal thrush noted  Respiratory: Breathing nonlabored; clear to auscultation  Cardiovascular: Regular rate   Gastrointestinal: Soft, nontender, nondistended, normoactive bowel sounds; no hepatosplenomegaly appreciated; no rebound tenderness or involuntary guarding  Extremities: No edema  Neurological: Alert and oriented to person, place and time;   Skin: No jaundice  Musculoskeletal: No significant peripheral atrophy  Psychiatric: Affect and mood appropriate      LABS:             14.8   4.45  )-----------( 176      ( 08-24 @ 05:14 )             44.0         08-24    140  |  106  |  12.1  ----------------------------<  76  4.2   |  24.0  |  0.88    Ca    9.0      24 Aug 2023 05:14  Phos  3.2     08-24  Mg     1.9     08-24    TPro  6.0<L>  /  Alb  3.5  /  TBili  0.8  /  DBili  x   /  AST  24  /  ALT  22  /  AlkPhos  53  08-24    LIVER FUNCTIONS - ( 24 Aug 2023 05:14 )  Alb: 3.5 g/dL / Pro: 6.0 g/dL / ALK PHOS: 53 U/L / ALT: 22 U/L / AST: 24 U/L / GGT: x             Lipase: 38 U/L (08-24-23 @ 05:14)        Urinalysis Basic - ( 24 Aug 2023 05:14 )    Color: x / Appearance: x / SG: x / pH: x  Gluc: 76 mg/dL / Ketone: x  / Bili: x / Urobili: x   Blood: x / Protein: x / Nitrite: x   Leuk Esterase: x / RBC: x / WBC x   Sq Epi: x / Non Sq Epi: x / Bacteria: x        IMAGING: I personally reviewed the CTAP and I agree with the radiologist's interpretation as described below: pd dilated         ACC: [id number]     EXAM:  CT ABDOMEN AND PELVIS   ORDERED BY: SUKI COTTER    PROCEDURE DATE:  08/23/2023        INTERPRETATION:  CLINICAL INFORMATION: Right lower quadrant and epigastric pain    COMPARISON: None.    CONTRAST/COMPLICATIONS:  IV Contrast: NONE  Oral Contrast: NONE  Complications: None reported at time of study completion    PROCEDURE:  CT of the Abdomen and Pelvis was performed.  Sagittal and coronal reformats were performed.    FINDINGS:  LOWER CHEST: 1.1 cm nodular opacity at the right lung base.    Limited evaluation without contrast.  LIVER: 4.1 cm hepatic lobe cyst. Additional small cyst in the central liver.  BILE DUCTS: Normal caliber.  GALLBLADDER: Within normal limits.  SPLEEN: Within normal limits.  PANCREAS: Prominence of the pancreas with mild dilatation of the main pancreatic duct measures up to 5 mm.  ADRENALS: Within normal limits.  KIDNEYS/URETERS: Small left renal cyst. No hydronephrosis.    BLADDER: Diffuse bladder wall thickening.  REPRODUCTIVE ORGANS: Prostate is enlarged.    BOWEL: No bowel obstruction. Appendix is not definitely visualized. Evaluation is markedly limited without oral or intravenous contrast  PERITONEUM: No ascites.  VESSELS: Within normal limits.  RETROPERITONEUM/LYMPH NODES: No lymphadenopathy.  ABDOMINAL WALL: Within normal limits.  BONES: Degenerative changes. Mild dextroscoliosis of the lumbar spine.    IMPRESSION:  Diffuse bladder wall thickening. Correlate with urinalysis for cystitis.    Appendix is not definitely visualized. However, evaluation is markedly limited without oral or intravenous contrast. Repeat CT with oral contrast is recommended if there is clinical concern for acute appendicitis.    Prominence of the pancreas with mild dilatation of the main pancreatic duct. Follow-up MRI/MRCP is recommended.    There is a 1.1 cm nodular opacity at the right lung base. Follow-up dedicated chest CT is recommended for further evaluation.        --- End of Report ---            FAYE SANTOS MD; Attending Radiologist  This document has been electronically signed. Aug 23 2023  2:43PM   HISTORY OF PRESENT ILLNESS: This is a 63y old man with a past medical history significant for polysubstance abuse who was transferred for evaluation of R ICA dissection after he reported daily headaches at rehab. GI was consulted for incidental finding of dilated PD.   He denies weight loss, nausea, vomiting, abdominal pain or diarrhea. He denies previous history of pancreatitis. He denies family history of pancreas cancer. He reports chronic tobacco use and daily alcohol use.   He reports resolution of headache and dizziness today.  ROS: A 14-point review of systems was completed and was otherwise negative save what was reported in the HPI.    PAST MEDICAL/SURGICAL HISTORY:  Anxiety      SOCIAL HISTORY:  - TOBACCO: ++  - ALCOHOL: ++  - ILLICIT DRUG USE: marijuana history of cocaine     FAMILY HISTORY:  No known history of gastrointestinal or liver disease;      HOME MEDICATIONS:  hydrOXYzine pamoate 50 mg oral capsule: 1 cap(s) orally 2 times a day as needed for  anxiety (24 Aug 2023 00:25)  QUEtiapine 100 mg oral tablet: 1 tab(s) orally once a day (at bedtime) (24 Aug 2023 00:25)  QUEtiapine 25 mg oral tablet: 1 tab(s) orally once a day (in the morning) (24 Aug 2023 00:25)  tamsulosin 0.4 mg oral capsule: 1 cap(s) orally once a day (at bedtime) (24 Aug 2023 00:25)  traZODone 50 mg oral tablet: 1 tab(s) orally once a day (at bedtime) (24 Aug 2023 00:25)    INPATIENT MEDICATIONS:  MEDICATIONS  (STANDING):  aspirin  chewable 81 milliGRAM(s) Oral daily  clopidogrel Tablet 75 milliGRAM(s) Oral daily  heparin   Injectable 5000 Unit(s) SubCutaneous every 12 hours  lactated ringers. 1000 milliLiter(s) (75 mL/Hr) IV Continuous <Continuous>  QUEtiapine 100 milliGRAM(s) Oral at bedtime  QUEtiapine 25 milliGRAM(s) Oral <User Schedule>  tamsulosin 0.4 milliGRAM(s) Oral at bedtime  traZODone 50 milliGRAM(s) Oral at bedtime    MEDICATIONS  (PRN):  acetaminophen     Tablet .. 650 milliGRAM(s) Oral every 6 hours PRN Temp greater or equal to 38C (100.4F), Mild Pain (1 - 3)  aluminum hydroxide/magnesium hydroxide/simethicone Suspension 30 milliLiter(s) Oral every 4 hours PRN Dyspepsia  hydrALAZINE Injectable 5 milliGRAM(s) IV Push every 4 hours PRN SBP > 140  hydrOXYzine hydrochloride 50 milliGRAM(s) Oral two times a day PRN Anxiety  melatonin 3 milliGRAM(s) Oral at bedtime PRN Insomnia  ondansetron Injectable 4 milliGRAM(s) IV Push every 8 hours PRN Nausea and/or Vomiting    ALLERGIES:  Allergy Status Unknown    T(C): 36.8 (08-23-23 @ 22:35), Max: 36.8 (08-23-23 @ 22:35)  HR: 51 (08-24-23 @ 06:00) (46 - 80)  BP: 121/81 (08-24-23 @ 06:00) (104/58 - 163/99)  RR: 15 (08-24-23 @ 06:00) (15 - 18)  SpO2: 98% (08-24-23 @ 06:00) (94% - 98%)      PHYSICAL EXAM:  Constitutional: thin in no apparent distress  Eyes: Sclerae anicteric, conjunctivae normal  ENMT: Mucus membranes moist, no oropharyngeal thrush noted  Respiratory: Breathing nonlabored; clear to auscultation  Cardiovascular: Regular rate   Gastrointestinal: Soft, nontender, nondistended, normoactive bowel sounds; no hepatosplenomegaly appreciated; no rebound tenderness or involuntary guarding  Extremities: No edema  Neurological: Alert and oriented to person, place and time;   Skin: No jaundice  Musculoskeletal: No significant peripheral atrophy  Psychiatric: Affect and mood appropriate      LABS:             14.8   4.45  )-----------( 176      ( 08-24 @ 05:14 )             44.0         08-24    140  |  106  |  12.1  ----------------------------<  76  4.2   |  24.0  |  0.88    Ca    9.0      24 Aug 2023 05:14  Phos  3.2     08-24  Mg     1.9     08-24    TPro  6.0<L>  /  Alb  3.5  /  TBili  0.8  /  DBili  x   /  AST  24  /  ALT  22  /  AlkPhos  53  08-24    LIVER FUNCTIONS - ( 24 Aug 2023 05:14 )  Alb: 3.5 g/dL / Pro: 6.0 g/dL / ALK PHOS: 53 U/L / ALT: 22 U/L / AST: 24 U/L / GGT: x             Lipase: 38 U/L (08-24-23 @ 05:14)        Urinalysis Basic - ( 24 Aug 2023 05:14 )    Color: x / Appearance: x / SG: x / pH: x  Gluc: 76 mg/dL / Ketone: x  / Bili: x / Urobili: x   Blood: x / Protein: x / Nitrite: x   Leuk Esterase: x / RBC: x / WBC x   Sq Epi: x / Non Sq Epi: x / Bacteria: x        IMAGING: I personally reviewed the CTAP and I agree with the radiologist's interpretation as described below: pd dilated         ACC: [id number]     EXAM:  CT ABDOMEN AND PELVIS   ORDERED BY: SUKI COTTER    PROCEDURE DATE:  08/23/2023        INTERPRETATION:  CLINICAL INFORMATION: Right lower quadrant and epigastric pain    COMPARISON: None.    CONTRAST/COMPLICATIONS:  IV Contrast: NONE  Oral Contrast: NONE  Complications: None reported at time of study completion    PROCEDURE:  CT of the Abdomen and Pelvis was performed.  Sagittal and coronal reformats were performed.    FINDINGS:  LOWER CHEST: 1.1 cm nodular opacity at the right lung base.    Limited evaluation without contrast.  LIVER: 4.1 cm hepatic lobe cyst. Additional small cyst in the central liver.  BILE DUCTS: Normal caliber.  GALLBLADDER: Within normal limits.  SPLEEN: Within normal limits.  PANCREAS: Prominence of the pancreas with mild dilatation of the main pancreatic duct measures up to 5 mm.  ADRENALS: Within normal limits.  KIDNEYS/URETERS: Small left renal cyst. No hydronephrosis.    BLADDER: Diffuse bladder wall thickening.  REPRODUCTIVE ORGANS: Prostate is enlarged.    BOWEL: No bowel obstruction. Appendix is not definitely visualized. Evaluation is markedly limited without oral or intravenous contrast  PERITONEUM: No ascites.  VESSELS: Within normal limits.  RETROPERITONEUM/LYMPH NODES: No lymphadenopathy.  ABDOMINAL WALL: Within normal limits.  BONES: Degenerative changes. Mild dextroscoliosis of the lumbar spine.    IMPRESSION:  Diffuse bladder wall thickening. Correlate with urinalysis for cystitis.    Appendix is not definitely visualized. However, evaluation is markedly limited without oral or intravenous contrast. Repeat CT with oral contrast is recommended if there is clinical concern for acute appendicitis.    Prominence of the pancreas with mild dilatation of the main pancreatic duct. Follow-up MRI/MRCP is recommended.    There is a 1.1 cm nodular opacity at the right lung base. Follow-up dedicated chest CT is recommended for further evaluation.        --- End of Report ---            FAYE SANTOS MD; Attending Radiologist  This document has been electronically signed. Aug 23 2023  2:43PM   HISTORY OF PRESENT ILLNESS: This is a 63y old man with a past medical history significant for polysubstance abuse who was transferred for evaluation of R ICA dissection after he reported daily headaches at rehab. GI was consulted for incidental finding of dilated PD.   He denies weight loss, nausea, vomiting, abdominal pain or diarrhea. He denies previous history of pancreatitis. He denies family history of pancreas cancer. He reports chronic tobacco use and daily alcohol use.   He reports resolution of headache and dizziness today.  ROS: A 14-point review of systems was completed and was otherwise negative save what was reported in the HPI.    PAST MEDICAL/SURGICAL HISTORY:  Anxiety      SOCIAL HISTORY:  - TOBACCO: ++  - ALCOHOL: ++  - ILLICIT DRUG USE: marijuana history of cocaine     FAMILY HISTORY:  No known history of gastrointestinal or liver disease;      HOME MEDICATIONS:  hydrOXYzine pamoate 50 mg oral capsule: 1 cap(s) orally 2 times a day as needed for  anxiety (24 Aug 2023 00:25)  QUEtiapine 100 mg oral tablet: 1 tab(s) orally once a day (at bedtime) (24 Aug 2023 00:25)  QUEtiapine 25 mg oral tablet: 1 tab(s) orally once a day (in the morning) (24 Aug 2023 00:25)  tamsulosin 0.4 mg oral capsule: 1 cap(s) orally once a day (at bedtime) (24 Aug 2023 00:25)  traZODone 50 mg oral tablet: 1 tab(s) orally once a day (at bedtime) (24 Aug 2023 00:25)    INPATIENT MEDICATIONS:  MEDICATIONS  (STANDING):  aspirin  chewable 81 milliGRAM(s) Oral daily  clopidogrel Tablet 75 milliGRAM(s) Oral daily  heparin   Injectable 5000 Unit(s) SubCutaneous every 12 hours  lactated ringers. 1000 milliLiter(s) (75 mL/Hr) IV Continuous <Continuous>  QUEtiapine 100 milliGRAM(s) Oral at bedtime  QUEtiapine 25 milliGRAM(s) Oral <User Schedule>  tamsulosin 0.4 milliGRAM(s) Oral at bedtime  traZODone 50 milliGRAM(s) Oral at bedtime    MEDICATIONS  (PRN):  acetaminophen     Tablet .. 650 milliGRAM(s) Oral every 6 hours PRN Temp greater or equal to 38C (100.4F), Mild Pain (1 - 3)  aluminum hydroxide/magnesium hydroxide/simethicone Suspension 30 milliLiter(s) Oral every 4 hours PRN Dyspepsia  hydrALAZINE Injectable 5 milliGRAM(s) IV Push every 4 hours PRN SBP > 140  hydrOXYzine hydrochloride 50 milliGRAM(s) Oral two times a day PRN Anxiety  melatonin 3 milliGRAM(s) Oral at bedtime PRN Insomnia  ondansetron Injectable 4 milliGRAM(s) IV Push every 8 hours PRN Nausea and/or Vomiting    ALLERGIES:  Allergy Status Unknown    T(C): 36.8 (08-23-23 @ 22:35), Max: 36.8 (08-23-23 @ 22:35)  HR: 51 (08-24-23 @ 06:00) (46 - 80)  BP: 121/81 (08-24-23 @ 06:00) (104/58 - 163/99)  RR: 15 (08-24-23 @ 06:00) (15 - 18)  SpO2: 98% (08-24-23 @ 06:00) (94% - 98%)      PHYSICAL EXAM:  Constitutional: thin in no apparent distress  Eyes: Sclerae anicteric, conjunctivae normal  ENMT: Mucus membranes moist, no oropharyngeal thrush noted  Respiratory: Breathing nonlabored; clear to auscultation  Cardiovascular: Regular rate   Gastrointestinal: Soft, nontender, nondistended, normoactive bowel sounds; no hepatosplenomegaly appreciated; no rebound tenderness or involuntary guarding  Extremities: No edema  Neurological: Alert and oriented to person, place and time;   Skin: No jaundice  Musculoskeletal: No significant peripheral atrophy  Psychiatric: Affect and mood appropriate      LABS:             14.8   4.45  )-----------( 176      ( 08-24 @ 05:14 )             44.0         08-24    140  |  106  |  12.1  ----------------------------<  76  4.2   |  24.0  |  0.88    Ca    9.0      24 Aug 2023 05:14  Phos  3.2     08-24  Mg     1.9     08-24    TPro  6.0<L>  /  Alb  3.5  /  TBili  0.8  /  DBili  x   /  AST  24  /  ALT  22  /  AlkPhos  53  08-24    LIVER FUNCTIONS - ( 24 Aug 2023 05:14 )  Alb: 3.5 g/dL / Pro: 6.0 g/dL / ALK PHOS: 53 U/L / ALT: 22 U/L / AST: 24 U/L / GGT: x             Lipase: 38 U/L (08-24-23 @ 05:14)        Urinalysis Basic - ( 24 Aug 2023 05:14 )    Color: x / Appearance: x / SG: x / pH: x  Gluc: 76 mg/dL / Ketone: x  / Bili: x / Urobili: x   Blood: x / Protein: x / Nitrite: x   Leuk Esterase: x / RBC: x / WBC x   Sq Epi: x / Non Sq Epi: x / Bacteria: x        IMAGING: I personally reviewed the CTAP and I agree with the radiologist's interpretation as described below: pd dilated         ACC: [id number]     EXAM:  CT ABDOMEN AND PELVIS   ORDERED BY: SUKI COTTER    PROCEDURE DATE:  08/23/2023        INTERPRETATION:  CLINICAL INFORMATION: Right lower quadrant and epigastric pain    COMPARISON: None.    CONTRAST/COMPLICATIONS:  IV Contrast: NONE  Oral Contrast: NONE  Complications: None reported at time of study completion    PROCEDURE:  CT of the Abdomen and Pelvis was performed.  Sagittal and coronal reformats were performed.    FINDINGS:  LOWER CHEST: 1.1 cm nodular opacity at the right lung base.    Limited evaluation without contrast.  LIVER: 4.1 cm hepatic lobe cyst. Additional small cyst in the central liver.  BILE DUCTS: Normal caliber.  GALLBLADDER: Within normal limits.  SPLEEN: Within normal limits.  PANCREAS: Prominence of the pancreas with mild dilatation of the main pancreatic duct measures up to 5 mm.  ADRENALS: Within normal limits.  KIDNEYS/URETERS: Small left renal cyst. No hydronephrosis.    BLADDER: Diffuse bladder wall thickening.  REPRODUCTIVE ORGANS: Prostate is enlarged.    BOWEL: No bowel obstruction. Appendix is not definitely visualized. Evaluation is markedly limited without oral or intravenous contrast  PERITONEUM: No ascites.  VESSELS: Within normal limits.  RETROPERITONEUM/LYMPH NODES: No lymphadenopathy.  ABDOMINAL WALL: Within normal limits.  BONES: Degenerative changes. Mild dextroscoliosis of the lumbar spine.    IMPRESSION:  Diffuse bladder wall thickening. Correlate with urinalysis for cystitis.    Appendix is not definitely visualized. However, evaluation is markedly limited without oral or intravenous contrast. Repeat CT with oral contrast is recommended if there is clinical concern for acute appendicitis.    Prominence of the pancreas with mild dilatation of the main pancreatic duct. Follow-up MRI/MRCP is recommended.    There is a 1.1 cm nodular opacity at the right lung base. Follow-up dedicated chest CT is recommended for further evaluation.        --- End of Report ---            FAYE SANTOS MD; Attending Radiologist  This document has been electronically signed. Aug 23 2023  2:43PM

## 2023-08-24 NOTE — CONSULT NOTE ADULT - SUBJECTIVE AND OBJECTIVE BOX
Preliminary note, official note pending attending review/signature.                               [facility] Stroke Team    CC: headache     HPI:  Patient seen/examined prior to [facility] on 8/23/23.    63M with PMHX MDD, Anxiety, ?Bipolar, BPH, Tobacco/THC Abuse, ETOH Abuse s/p [place] [facility] x 10 days presented to [facility] c/o HA (x 1-2 weeks) and noted Blurry Vision. CTH negative. CTA Head/Neck concerning for possible Right  ICA Dissection. MRI Brain, MRA Head/Neck performed at [facility] confirming R ICA Dissection. Patient [place]av[place] [facility] and transferred to [facility] for Neuro IR eval. Also noted to have episode of hypoglycemia at [facility] which resolved and was also c/o epigastric abd pain with nausea for which CT ABD/PEL revealed dilated pancreatic duct.   [facility] ED and [facility] ED discussed case with [facility] MICU and [facility] Neuro IR. No other complaints.    PAST MEDICAL & SURGICAL HISTORY:  Anxiety  MDD  ?bipolar  Etoh use (states " alcoholic and addict")  nicotine use     MEDICATIONS  (STANDING):  clopidogrel Tablet 75 milliGRAM(s) Oral daily  famotidine    Tablet 20 milliGRAM(s) Oral two times a day  heparin   Injectable 5000 Unit(s) SubCutaneous every 12 hours  lactated ringers. 1000 milliLiter(s) (75 mL/Hr) IV Continuous <Continuous>  QUEtiapine 25 milliGRAM(s) Oral <User Schedule>  QUEtiapine 100 milliGRAM(s) Oral at bedtime  tamsulosin 0.4 milliGRAM(s) Oral at bedtime  traZODone 50 milliGRAM(s) Oral at bedtime    MEDICATIONS  (PRN):  acetaminophen     Tablet .. 650 milliGRAM(s) Oral every 6 hours PRN Temp greater or equal to 38C (100.4F), Mild Pain (1 - 3)  aluminum hydroxide/magnesium hydroxide/simethicone Suspension 30 milliLiter(s) Oral every 4 hours PRN Dyspepsia  hydrALAZINE Injectable 5 milliGRAM(s) IV Push every 4 hours PRN SBP > 140  hydrOXYzine hydrochloride 50 milliGRAM(s) Oral two times a day PRN Anxiety  melatonin 3 milliGRAM(s) Oral at bedtime PRN Insomnia  ondansetron Injectable 4 milliGRAM(s) IV Push every 8 hours PRN Nausea and/or Vomiting    Allergies  Denies     Intolerances    SOCIAL HISTORY:  no tob,   no alcohol   no drugs    FAMILY HISTORY:  noted family hx of aneurysm ?   ESRD in sibling       ROS: 14 point ROS negative other than what is present in HPI or below. States first morning he woke up without a headache.     Vital Signs Last 24 Hrs  T(C): 36.6 (24 Aug 2023 13:47), Max: 36.8 (23 Aug 2023 22:35)  T(F): 97.8 (24 Aug 2023 13:47), Max: 98.2 (23 Aug 2023 22:35)  HR: 58 (24 Aug 2023 12:00) (46 - 80)  BP: 136/80 (24 Aug 2023 12:00) (104/58 - 163/99)  BP(mean): 88 (24 Aug 2023 06:00) (87 - 93)  RR: 16 (24 Aug 2023 12:00) (15 - 18)  SpO2: 98% (24 Aug 2023 12:00) (94% - 98%)    Parameters below as of 24 Aug 2023 12:00  Patient On (Oxygen Delivery Method): room air          General: NAD    Detailed Neurologic Exam:    Mental status: The patient is awake and alert and has normal attention span.  The patient is fully oriented in 3 spheres. The patient is oriented to current events. The patient is able to name objects, follow commands, repeat sentences.    Cranial nerves: slight left lip depression, right eye ptosis (patient states baseline from young age, hx head trauma), right pupil 2mm reactive, left pupil 3 mm reactive,  There is no visual field deficit to confrontation. Extraocular motion is full with no nystagmus. There is no ptosis. Facial sensation is intact.  alate elevates symmetrically. Tongue is midline.    Motor: There is normal bulk and tone.  There is no tremor.  Strength is 5/5 in the right arm and leg.   Strength is 5/5 in the left arm and leg.    Sensation: Intact to light touch and pin in 4 extremities, temperature sensation intact overall and symmetric, no extinction     Cerebellar: There is no dysmetria on finger to nose testing.    Gait : deferred    [facility] SS:  DATE: 8/24/23  TIME: 1135  1A: Level of consciousness (0-3):   1B: Questions (0-2):   1C: Commands (0-2):   2: Gaze (0-2):   3: Visual fields (0-3):   4: Facial palsy (0-3): 1  MOTOR:  5A: Left arm motor drift (0-4):   5B: Right arm motor drift (0-4):   6A: Left leg motor drift (0-4):   6B: Right leg motor drift (0-4):   7: Limb ataxia (0-2):   SENSORY:  8: Sensation (0-2):   SPEECH:  9: Language (0-3):   10: Dysarthria (0-2):   EXTINCTION:  11: Extinction/inattention (0-2):     TOTAL SCORE: 1    prehospital mRS= 0      LABS:                         14.8   4.45  )-----------( 176      ( 24 Aug 2023 05:14 )             44.0       08-24    140  |  106  |  12.1  ----------------------------<  76  4.2   |  24.0  |  0.88    Ca    9.0      24 Aug 2023 05:14  Phos  3.2     08-24  Mg     1.9     08-24    TPro  6.0<L>  /  Alb  3.5  /  TBili  0.8  /  DBili  x   /  AST  24  /  ALT  22  /  AlkPhos  53  08-24          Lipid panel: pending      HgbA1c: pending       RADIOLOGY & ADDITIONAL STUDIES (independently reviewed unless otherwise noted):  PROCEDURE DATE:  08/23/2023  MRI BRAIN:  1. Several small nonspecific foci of T2 increased signal on FLAIR acquisition imaging bilaterally at the level the corona radiata and centrum semiovale ovale, with the most prominent noted on the left at the level the corona radiata, measuring approximately 7.5 mm in width x 4 mm in AP diameter, which may represent a prior infectious, inflammatory, ischemic, or demyelinating process.   No acute intracranial pathology. Specifically, no restricted diffusion. No acute ischemic event.  2. No abnormal intraparenchymal or leptomeningeal enhancement.  3. Mild paranasal sinus inflammatory changes.    MRA NECK AND [place]:  1. Dissection appreciated association with the right internal carotid artery with the proximal extent appreciated just beyond the bulb of the right internal carotid artery. The right internal carotid artery dissection persists distally to the level of the skull base/proximal petrous right internal carotid artery.  2. Findings also include suggestion of short segment stenosis involving an M2 branch of the right middle cerebral artery.  3. No evidence of aneurysm formation at the level of the [place].  4. Fetal origin of the left posterior cerebral artery.  5. Prominent right posterior communicating artery contributes to flow within the right posterior cerebral artery.  6. No evidence of aneurysm formation at the level of the [place].    8/23/2023  IMPRESSION:  CTA Neck: Findings most compatible with right cervical ICA dissection extending to the skull base. Recommend further evaluation with MRA of the neck and MRI/MRA of the brain.  CTA Head: No large vessel occlusion, significant stenosis, dissection, or saccular aneurysm.   Preliminary note, official note pending attending review/signature.                               [facility] Stroke Team    CC: headache     HPI:  Patient seen/examined prior to [facility] on 8/23/23.    63M with PMHX MDD, Anxiety, ?Bipolar, BPH, Tobacco/THC Abuse, ETOH Abuse s/p [place] [facility] x 10 days [place] [facility] [place] HA (x 1-2 weeks) and noted Blurry Vision. CTH negative. CTA Head/Neck concerning for possible Right  ICA Dissection. MRI Brain, MRA Head/Neck performed at [facility] confirming R ICA Dissection. Patient loaded with ASA/Plavix at [facility] and transferred to [facility] for Neuro IR eval. Also noted to have episode of hypoglycemia at [facility] which resolved and was also c/o epigastric abd pain with nausea for which CT ABD/PEL revealed dilated pancreatic duct.   [facility] ED and [facility] ED discussed case with [facility] MICU and [facility] Neuro IR. No other complaints.    PAST MEDICAL & SURGICAL HISTORY:  Anxiety  MDD  ?bipolar  Etoh use (states " alcoholic and addict")  nicotine use     MEDICATIONS  (STANDING):  clopidogrel Tablet 75 milliGRAM(s) Oral daily  famotidine    Tablet 20 milliGRAM(s) Oral two times a day  heparin   Injectable 5000 Unit(s) SubCutaneous every 12 hours  lactated ringers. 1000 milliLiter(s) (75 mL/Hr) IV Continuous <Continuous>  QUEtiapine 25 milliGRAM(s) Oral <User Schedule>  QUEtiapine 100 milliGRAM(s) Oral at bedtime  tamsulosin 0.4 milliGRAM(s) Oral at bedtime  traZODone 50 milliGRAM(s) Oral at bedtime    MEDICATIONS  (PRN):  acetaminophen     Tablet .. 650 milliGRAM(s) Oral every 6 hours PRN Temp greater or equal to 38C (100.4F), Mild Pain (1 - 3)  aluminum hydroxide/magnesium hydroxide/simethicone Suspension 30 milliLiter(s) Oral every 4 hours PRN Dyspepsia  hydrALAZINE Injectable 5 milliGRAM(s) IV Push every 4 hours PRN SBP > 140  hydrOXYzine hydrochloride 50 milliGRAM(s) Oral two times a day PRN Anxiety  melatonin 3 milliGRAM(s) Oral at bedtime PRN Insomnia  ondansetron Injectable 4 milliGRAM(s) IV Push every 8 hours PRN Nausea and/or Vomiting    Allergies  Denies     Intolerances    SOCIAL HISTORY:  no tob,   no alcohol   no drugs    FAMILY HISTORY:  noted family hx of aneurysm ?   ESRD in sibling       ROS: 14 point ROS negative other than what is present in HPI or below. States first morning he woke up without a headache.     Vital Signs Last 24 Hrs  T(C): 36.6 (24 Aug 2023 13:47), Max: 36.8 (23 Aug 2023 22:35)  T(F): 97.8 (24 Aug 2023 13:47), Max: 98.2 (23 Aug 2023 22:35)  HR: 58 (24 Aug 2023 12:00) (46 - 80)  BP: 136/80 (24 Aug 2023 12:00) (104/58 - 163/99)  BP(mean): 88 (24 Aug 2023 06:00) (87 - 93)  RR: 16 (24 Aug 2023 12:00) (15 - 18)  SpO2: 98% (24 Aug 2023 12:00) (94% - 98%)    Parameters below as of 24 Aug 2023 12:00  Patient On (Oxygen Delivery Method): room air          General: NAD    Detailed Neurologic Exam:    Mental status: The patient is awake and alert and has normal attention span.  The patient is fully oriented in 3 spheres. The patient is oriented to current events. The patient is able to name objects, follow commands, repeat sentences.    Cranial nerves: slight left lip depression, right eye ptosis (patient states baseline from young age, hx head trauma), right pupil 2mm reactive, left pupil 3 mm reactive,  There is no visual field deficit to confrontation. Extraocular motion is full with no nystagmus. There is no ptosis. Facial sensation is intact.  alate elevates symmetrically. Tongue is midline.    Motor: There is normal bulk and tone.  There is no tremor.  Strength is 5/5 in the right arm and leg.   Strength is 5/5 in the left arm and leg.    Sensation: Intact to light touch and pin in 4 extremities, temperature sensation intact overall and symmetric, no extinction     Cerebellar: There is no dysmetria on finger to nose testing.    Gait : deferred    [facility] SS:  DATE: 8/24/23  TIME: 1135  1A: Level of consciousness (0-3):   1B: Questions (0-2):   1C: Commands (0-2):   2: Gaze (0-2):   3: Visual fields (0-3):   4: Facial palsy (0-3): 1  MOTOR:  5A: Left arm motor drift (0-4):   5B: Right arm motor drift (0-4):   6A: Left leg motor drift (0-4):   6B: Right leg motor drift (0-4):   7: Limb ataxia (0-2):   SENSORY:  8: Sensation (0-2):   SPEECH:  9: Language (0-3):   10: Dysarthria (0-2):   EXTINCTION:  11: Extinction/inattention (0-2):     TOTAL SCORE: 1    prehospital mRS= 0      LABS:                         14.8   4.45  )-----------( 176      ( 24 Aug 2023 05:14 )             44.0       08-24    140  |  106  |  12.1  ----------------------------<  76  4.2   |  24.0  |  0.88    Ca    9.0      24 Aug 2023 05:14  Phos  3.2     08-24  Mg     1.9     08-24    TPro  6.0<L>  /  Alb  3.5  /  TBili  0.8  /  DBili  x   /  AST  24  /  ALT  22  /  AlkPhos  53  08-24          Lipid panel: pending      HgbA1c: pending       RADIOLOGY & ADDITIONAL STUDIES (independently reviewed unless otherwise noted):  PROCEDURE DATE:  08/23/2023  MRI BRAIN:  1. Several small nonspecific foci of T2 increased signal on FLAIR acquisition imaging bilaterally at the level the corona radiata and centrum semiovale ovale, with the most prominent noted on the left at the level the corona radiata, measuring approximately 7.5 mm in width x 4 mm in AP diameter, which may represent a prior infectious, inflammatory, ischemic, or demyelinating process.   No acute intracranial pathology. Specifically, no restricted diffusion. No acute ischemic event.  2. No abnormal intraparenchymal or leptomeningeal enhancement.  3. Mild paranasal sinus inflammatory changes.    MRA NECK AND [place]:  1. Dissection appreciated association with the right internal carotid artery with the proximal extent appreciated just beyond the bulb of the right internal carotid artery. The right internal carotid artery dissection persists distally to the level of the skull base/proximal petrous right internal carotid artery.  2. Findings also include suggestion of short segment stenosis involving an M2 branch of the right middle cerebral artery.  3. No evidence of aneurysm formation at the level of the [place] of [place].  4. Fetal origin of the left posterior cerebral artery.  5. Prominent right posterior communicating artery contributes to flow within the right posterior cerebral artery.  6. No evidence of aneurysm formation at the level of the [place] of [place].    8/23/2023  IMPRESSION:  CTA Neck: Findings most compatible with right cervical ICA dissection extending to the skull base. Recommend further evaluation with MRA of the neck and MRI/MRA of the brain.  CTA Head: No large vessel occlusion, significant stenosis, dissection, or saccular aneurysm.   Preliminary note, official note pending attending review/signature.                               [facility] Stroke Team    CC: headache     HPI:  Patient seen/examined prior to [facility] on 8/23/23.    63M with PMHX MDD, Anxiety, ?Bipolar, BPH, Tobacco/THC Abuse, ETOH Abuse s/p [place] [facility] x 10 days presented to [facility] [place]/o HA (x 1-2 weeks) and noted Blurry Vision. CTH negative. CTA Head/Neck concerning for possible Right  ICA Dissection. MRI Brain, MRA Head/Neck performed at [facility] confirming R ICA Dissection. [place] [facility] [place] [facility] for Neuro IR eval. Also noted to have episode of hypoglycemia at [facility] which resolved and was also c/o epigastric abd pain with nausea for which CT ABD/PEL revealed dilated pancreatic duct.   [facility] ED and [facility] ED discussed case with [facility] MICU and [facility] Neuro IR. No other complaints.    PAST MEDICAL & SURGICAL HISTORY:  Anxiety  MDD  ?bipolar  Etoh use (states " alcoholic and addict")  nicotine use     MEDICATIONS  (STANDING):  clopidogrel Tablet 75 milliGRAM(s) Oral daily  famotidine    Tablet 20 milliGRAM(s) Oral two times a day  heparin   Injectable 5000 Unit(s) SubCutaneous every 12 hours  lactated ringers. 1000 milliLiter(s) (75 mL/Hr) IV Continuous <Continuous>  QUEtiapine 25 milliGRAM(s) Oral <User Schedule>  QUEtiapine 100 milliGRAM(s) Oral at bedtime  tamsulosin 0.4 milliGRAM(s) Oral at bedtime  traZODone 50 milliGRAM(s) Oral at bedtime    MEDICATIONS  (PRN):  acetaminophen     Tablet .. 650 milliGRAM(s) Oral every 6 hours PRN Temp greater or equal to 38C (100.4F), Mild Pain (1 - 3)  aluminum hydroxide/magnesium hydroxide/simethicone Suspension 30 milliLiter(s) Oral every 4 hours PRN Dyspepsia  hydrALAZINE Injectable 5 milliGRAM(s) IV Push every 4 hours PRN SBP > 140  hydrOXYzine hydrochloride 50 milliGRAM(s) Oral two times a day PRN Anxiety  melatonin 3 milliGRAM(s) Oral at bedtime PRN Insomnia  ondansetron Injectable 4 milliGRAM(s) IV Push every 8 hours PRN Nausea and/or Vomiting    Allergies  Denies     Intolerances    SOCIAL HISTORY:  no tob,   no alcohol   no drugs    FAMILY HISTORY:  noted family hx of aneurysm ?   ESRD in sibling       ROS: 14 point ROS negative other than what is present in HPI or below. States first morning he woke up without a headache.     Vital Signs Last 24 Hrs  T(C): 36.6 (24 Aug 2023 13:47), Max: 36.8 (23 Aug 2023 22:35)  T(F): 97.8 (24 Aug 2023 13:47), Max: 98.2 (23 Aug 2023 22:35)  HR: 58 (24 Aug 2023 12:00) (46 - 80)  BP: 136/80 (24 Aug 2023 12:00) (104/58 - 163/99)  BP(mean): 88 (24 Aug 2023 06:00) (87 - 93)  RR: 16 (24 Aug 2023 12:00) (15 - 18)  SpO2: 98% (24 Aug 2023 12:00) (94% - 98%)    Parameters below as of 24 Aug 2023 12:00  Patient On (Oxygen Delivery Method): room air          General: NAD    Detailed Neurologic Exam:    Mental status: The patient is awake and alert and has normal attention span.  The patient is fully oriented in 3 spheres. The patient is oriented to current events. The patient is able to name objects, follow commands, repeat sentences.    Cranial nerves: slight left lip depression, right eye ptosis (patient states baseline from young age, hx head trauma), right pupil 2mm reactive, left pupil 3 mm reactive,  There is no visual field deficit to confrontation. Extraocular motion is full with no nystagmus. There is no ptosis. Facial sensation is intact.  alate elevates symmetrically. Tongue is midline.    Motor: There is normal bulk and tone.  There is no tremor.  Strength is 5/5 in the right arm and leg.   Strength is 5/5 in the left arm and leg.    Sensation: Intact to light touch and pin in 4 extremities, temperature sensation intact overall and symmetric, no extinction     Cerebellar: There is no dysmetria on finger to nose testing.    Gait : deferred    [facility] SS:  DATE: 8/24/23  TIME: 1135  1A: Level of consciousness (0-3):   1B: Questions (0-2):   1C: Commands (0-2):   2: Gaze (0-2):   3: Visual fields (0-3):   4: Facial palsy (0-3): 1  MOTOR:  5A: Left arm motor drift (0-4):   5B: Right arm motor drift (0-4):   6A: Left leg motor drift (0-4):   6B: Right leg motor drift (0-4):   7: Limb ataxia (0-2):   SENSORY:  8: Sensation (0-2):   SPEECH:  9: Language (0-3):   10: Dysarthria (0-2):   EXTINCTION:  11: Extinction/inattention (0-2):     TOTAL SCORE: 1    prehospital mRS= 0      LABS:                         14.8   4.45  )-----------( 176      ( 24 Aug 2023 05:14 )             44.0       08-24    140  |  106  |  12.1  ----------------------------<  76  4.2   |  24.0  |  0.88    Ca    9.0      24 Aug 2023 05:14  Phos  3.2     08-24  Mg     1.9     08-24    TPro  6.0<L>  /  Alb  3.5  /  TBili  0.8  /  DBili  x   /  AST  24  /  ALT  22  /  AlkPhos  53  08-24          Lipid panel: pending      HgbA1c: pending       RADIOLOGY & ADDITIONAL STUDIES (independently reviewed unless otherwise noted):  PROCEDURE DATE:  08/23/2023  MRI BRAIN:  1. Several small nonspecific foci of T2 increased signal on FLAIR acquisition imaging bilaterally at the level the corona radiata and centrum semiovale ovale, with the most prominent noted on the left at the level the corona radiata, measuring approximately 7.5 mm in width x 4 mm in AP diameter, which may represent a prior infectious, inflammatory, ischemic, or demyelinating process.   No acute intracranial pathology. Specifically, no restricted diffusion. No acute ischemic event.  2. No abnormal intraparenchymal or leptomeningeal enhancement.  3. Mild paranasal sinus inflammatory changes.    MRA NECK AND [place]:  1. Dissection appreciated association with the right internal carotid artery with the proximal extent appreciated just beyond the bulb of the right internal carotid artery. The right internal carotid artery dissection persists distally to the level of the skull base/proximal petrous right internal carotid artery.  2. Findings also include suggestion of short segment stenosis involving an M2 branch of the right middle cerebral artery.  3. No evidence of aneurysm formation at the level of the [place].  4. Fetal origin of the left posterior cerebral artery.  5. Prominent right posterior communicating artery contributes to flow within the right posterior cerebral artery.  6. No evidence of aneurysm formation at the level of the [place].    8/23/2023  IMPRESSION:  CTA Neck: Findings most compatible with right cervical ICA dissection extending to the skull base. Recommend further evaluation with MRA of the neck and MRI/MRA of the brain.  CTA Head: No large vessel occlusion, significant stenosis, dissection, or saccular aneurysm.   Preliminary note, official note pending attending review/signature.                               [facility] Stroke Team    CC: headache     HPI:  Patient seen/examined prior to [facility] on 8/23/23.    63M with PMHX MDD, Anxiety, ?Bipolar, BPH, Tobacco/THC Abuse, ETOH Abuse s/p [place] Detox x 10 days [place] [facility] [place] (x 1-2 weeks) and noted Blurry Vision. CTH negative. CTA Head/Neck concerning for possible Right  ICA Dissection. MRI Brain, MRA Head/Neck [place] [facility] [place] R ICA Dissection. Patient loaded with [facility]SA/Plavix at [facility] and transferred to [facility] for Neuro IR eval. Also noted to have episode of hypoglycemia at [facility] which resolved and was also c/o epigastric abd pain with nausea for which CT ABD/PEL revealed dilated pancreatic duct.   [facility] and [facility] ED discussed case with [facility] and [facility] Neuro IR. No other complaints.    PAST MEDICAL & SURGICAL HISTORY:  Anxiety  MDD  ?bipolar  Etoh use (states " alcoholic and addict")  nicotine use     MEDICATIONS  (STANDING):  clopidogrel Tablet 75 milliGRAM(s) Oral daily  famotidine    Tablet 20 milliGRAM(s) Oral two times a day  heparin   Injectable 5000 Unit(s) SubCutaneous every 12 hours  lactated ringers. 1000 milliLiter(s) (75 mL/Hr) IV Continuous <Continuous>  QUEtiapine 25 milliGRAM(s) Oral <User Schedule>  QUEtiapine 100 milliGRAM(s) Oral at bedtime  tamsulosin 0.4 milliGRAM(s) Oral at bedtime  traZODone 50 milliGRAM(s) Oral at bedtime    MEDICATIONS  (PRN):  acetaminophen     Tablet .. 650 milliGRAM(s) Oral every 6 hours PRN Temp greater or equal to 38C (100.4F), Mild Pain (1 - 3)  aluminum hydroxide/magnesium hydroxide/simethicone Suspension 30 milliLiter(s) Oral every 4 hours PRN Dyspepsia  hydrALAZINE Injectable 5 milliGRAM(s) IV Push every 4 hours PRN SBP > 140  hydrOXYzine hydrochloride 50 milliGRAM(s) Oral two times a day PRN Anxiety  melatonin 3 milliGRAM(s) Oral at bedtime PRN Insomnia  ondansetron Injectable 4 milliGRAM(s) IV Push every 8 hours PRN Nausea and/or Vomiting    Allergies  Denies     Intolerances    SOCIAL HISTORY:   tob,    alcohol   no drugs    FAMILY HISTORY:  noted family hx of aneurysm ?   ESRD in sibling       ROS: 14 point ROS negative other than what is present in HPI or below. States first morning he woke up without a headache.     Vital Signs Last 24 Hrs  T(C): 36.6 (24 Aug 2023 13:47), Max: 36.8 (23 Aug 2023 22:35)  T(F): 97.8 (24 Aug 2023 13:47), Max: 98.2 (23 Aug 2023 22:35)  HR: 58 (24 Aug 2023 12:00) (46 - 80)  BP: 136/80 (24 Aug 2023 12:00) (104/58 - 163/99)  BP(mean): 88 (24 Aug 2023 06:00) (87 - 93)  RR: 16 (24 Aug 2023 12:00) (15 - 18)  SpO2: 98% (24 Aug 2023 12:00) (94% - 98%)    Parameters below as of 24 Aug 2023 12:00  Patient On (Oxygen Delivery Method): room air          General: NAD    Detailed Neurologic Exam:    Mental status: The patient is awake and alert and has normal attention span.  The patient is fully oriented in 3 spheres. The patient is oriented to current events. The patient is able to name objects, follow commands, repeat sentences.    Cranial nerves: slight left lip depression, right eye ptosis (patient states baseline from young age, hx head trauma), right pupil 2mm reactive, left pupil 3 mm reactive,  There is no visual field deficit to confrontation. Extraocular motion is full with no nystagmus. There is no ptosis. Facial sensation is intact.  alate elevates symmetrically. Tongue is midline.    Motor: There is normal bulk and tone.  There is no tremor.  Strength is 5/5 in the right arm and leg.   Strength is 5/5 in the left arm and leg.    Sensation: Intact to light touch and pin in 4 extremities, temperature sensation intact overall and symmetric, no extinction     Cerebellar: There is no dysmetria on finger to nose testing.    Gait : deferred    NIH SS:  DATE: 8/24/23  TIME: 1135  1A: Level of consciousness (0-3):   1B: Questions (0-2):   1C: Commands (0-2):   2: Gaze (0-2):   3: Visual fields (0-3):   4: Facial palsy (0-3): 1  MOTOR:  5A: Left arm motor drift (0-4):   5B: Right arm motor drift (0-4):   6A: Left leg motor drift (0-4):   6B: Right leg motor drift (0-4):   7: Limb ataxia (0-2):   SENSORY:  8: Sensation (0-2):   SPEECH:  9: Language (0-3):   10: Dysarthria (0-2):   EXTINCTION:  11: Extinction/inattention (0-2):     TOTAL SCORE: 1    prehospital mRS= 0      LABS:                         14.8   4.45  )-----------( 176      ( 24 Aug 2023 05:14 )             44.0       08-24    140  |  106  |  12.1  ----------------------------<  76  4.2   |  24.0  |  0.88    Ca    9.0      24 Aug 2023 05:14  Phos  3.2     08-24  Mg     1.9     08-24    TPro  6.0<L>  /  Alb  3.5  /  TBili  0.8  /  DBili  x   /  AST  24  /  ALT  22  /  AlkPhos  53  08-24          Lipid panel: pending      HgbA1c: pending       RADIOLOGY & ADDITIONAL STUDIES (independently reviewed unless otherwise noted):  PROCEDURE DATE:  08/23/2023  MRI BRAIN:  1. Several small nonspecific foci of T2 increased signal on FLAIR acquisition imaging bilaterally at the level the corona radiata and centrum semiovale ovale, with the most prominent noted on the left at the level the corona radiata, measuring approximately 7.5 mm in width x 4 mm in AP diameter, which may represent a prior infectious, inflammatory, ischemic, or demyelinating process.   No acute intracranial pathology. Specifically, no restricted diffusion. No acute ischemic event.  2. No abnormal intraparenchymal or leptomeningeal enhancement.  3. Mild paranasal sinus inflammatory changes.    MRA NECK AND [place] OF [place]:  1. Dissection appreciated association with the right internal carotid artery with the proximal extent appreciated just beyond the bulb of the right internal carotid artery. The right internal carotid artery dissection persists distally to the level of the skull base/proximal petrous right internal carotid artery.  2. Findings also include suggestion of short segment stenosis involving an M2 branch of the right middle cerebral artery.  3. No evidence of aneurysm formation at the level of the [place] of [place].  4. Fetal origin of the left posterior cerebral artery.  5. Prominent right posterior communicating artery contributes to flow within the right posterior cerebral artery.  6. No evidence of aneurysm formation at the level of the [place] of [place].    8/23/2023  IMPRESSION:  CTA Neck: Findings most compatible with right cervical ICA dissection extending to the skull base. Recommend further evaluation with MRA of the neck and MRI/MRA of the brain.  CTA Head: No large vessel occlusion, significant stenosis, dissection, or saccular aneurysm.   Preliminary note, official note pending attending review/signature.                               [facility] Stroke Team    CC: headache     HPI:  Patient seen/examined prior to [facility] on 8/23/23.    63M with PMHX MDD, Anxiety, ?Bipolar, BPH, Tobacco/THC Abuse, ETOH Abuse s/p [place] [facility] x 10 days [place] [facility] [place] (x 1-2 weeks) and noted Blurry Vision. CTH negative. CTA Head/Neck concerning for possible Right  ICA Dissection. MRI Brain, MRA Head/Neck performed at [facility] confirming R ICA Dissection. Patient loaded with ASA/Plavix at [facility] and transferred to [facility] for Neuro IR eval. Also noted to have episode of hypoglycemia at [facility] which resolved and was also c/o epigastric abd pain with nausea for which CT ABD/PEL revealed dilated pancreatic duct.   [facility] ED and [facility] ED discussed case with [facility] MICU and [facility] Neuro IR. No other complaints.    PAST MEDICAL & SURGICAL HISTORY:  Anxiety  MDD  ?bipolar  Etoh use (states " alcoholic and addict")  nicotine use     MEDICATIONS  (STANDING):  clopidogrel Tablet 75 milliGRAM(s) Oral daily  famotidine    Tablet 20 milliGRAM(s) Oral two times a day  heparin   Injectable 5000 Unit(s) SubCutaneous every 12 hours  lactated ringers. 1000 milliLiter(s) (75 mL/Hr) IV Continuous <Continuous>  QUEtiapine 25 milliGRAM(s) Oral <User Schedule>  QUEtiapine 100 milliGRAM(s) Oral at bedtime  tamsulosin 0.4 milliGRAM(s) Oral at bedtime  traZODone 50 milliGRAM(s) Oral at bedtime    MEDICATIONS  (PRN):  acetaminophen     Tablet .. 650 milliGRAM(s) Oral every 6 hours PRN Temp greater or equal to 38C (100.4F), Mild Pain (1 - 3)  aluminum hydroxide/magnesium hydroxide/simethicone Suspension 30 milliLiter(s) Oral every 4 hours PRN Dyspepsia  hydrALAZINE Injectable 5 milliGRAM(s) IV Push every 4 hours PRN SBP > 140  hydrOXYzine hydrochloride 50 milliGRAM(s) Oral two times a day PRN Anxiety  melatonin 3 milliGRAM(s) Oral at bedtime PRN Insomnia  ondansetron Injectable 4 milliGRAM(s) IV Push every 8 hours PRN Nausea and/or Vomiting    Allergies  Denies     Intolerances    SOCIAL HISTORY:   tob,    alcohol   no drugs    FAMILY HISTORY:  noted family hx of aneurysm ?   ESRD in sibling       ROS: 14 point ROS negative other than what is present in HPI or below. States first morning he woke up without a headache.     Vital Signs Last 24 Hrs  T(C): 36.6 (24 Aug 2023 13:47), Max: 36.8 (23 Aug 2023 22:35)  T(F): 97.8 (24 Aug 2023 13:47), Max: 98.2 (23 Aug 2023 22:35)  HR: 58 (24 Aug 2023 12:00) (46 - 80)  BP: 136/80 (24 Aug 2023 12:00) (104/58 - 163/99)  BP(mean): 88 (24 Aug 2023 06:00) (87 - 93)  RR: 16 (24 Aug 2023 12:00) (15 - 18)  SpO2: 98% (24 Aug 2023 12:00) (94% - 98%)    Parameters below as of 24 Aug 2023 12:00  Patient On (Oxygen Delivery Method): room air          General: NAD    Detailed Neurologic Exam:    Mental status: The patient is awake and alert and has normal attention span.  The patient is fully oriented in 3 spheres. The patient is oriented to current events. The patient is able to name objects, follow commands, repeat sentences.    Cranial nerves: slight left lip depression, right eye ptosis (patient states baseline from young age, hx head trauma), right pupil 2mm reactive, left pupil 3 mm reactive,  There is no visual field deficit to confrontation. Extraocular motion is full with no nystagmus. There is no ptosis. Facial sensation is intact.  alate elevates symmetrically. Tongue is midline.    Motor: There is normal bulk and tone.  There is no tremor.  Strength is 5/5 in the right arm and leg.   Strength is 5/5 in the left arm and leg.    Sensation: Intact to light touch and pin in 4 extremities, temperature sensation intact overall and symmetric, no extinction     Cerebellar: There is no dysmetria on finger to nose testing.    Gait : deferred    NIH SS:  DATE: 8/24/23  TIME: 1135  1A: Level of consciousness (0-3):   1B: Questions (0-2):   1C: Commands (0-2):   2: Gaze (0-2):   3: Visual fields (0-3):   4: Facial palsy (0-3): 1  MOTOR:  5A: Left arm motor drift (0-4):   5B: Right arm motor drift (0-4):   6A: Left leg motor drift (0-4):   6B: Right leg motor drift (0-4):   7: Limb ataxia (0-2):   SENSORY:  8: Sensation (0-2):   SPEECH:  9: Language (0-3):   10: Dysarthria (0-2):   EXTINCTION:  11: Extinction/inattention (0-2):     TOTAL SCORE: 1    prehospital mRS= 0      LABS:                         14.8   4.45  )-----------( 176      ( 24 Aug 2023 05:14 )             44.0       08-24    140  |  106  |  12.1  ----------------------------<  76  4.2   |  24.0  |  0.88    Ca    9.0      24 Aug 2023 05:14  Phos  3.2     08-24  Mg     1.9     08-24    TPro  6.0<L>  /  Alb  3.5  /  TBili  0.8  /  DBili  x   /  AST  24  /  ALT  22  /  AlkPhos  53  08-24          Lipid panel: pending      HgbA1c: pending       RADIOLOGY & ADDITIONAL STUDIES (independently reviewed unless otherwise noted):  PROCEDURE DATE:  08/23/2023  MRI BRAIN:  1. Several small nonspecific foci of T2 increased signal on FLAIR acquisition imaging bilaterally at the level the corona radiata and centrum semiovale ovale, with the most prominent noted on the left at the level the corona radiata, measuring approximately 7.5 mm in width x 4 mm in AP diameter, which may represent a prior infectious, inflammatory, ischemic, or demyelinating process.   No acute intracranial pathology. Specifically, no restricted diffusion. No acute ischemic event.  2. No abnormal intraparenchymal or leptomeningeal enhancement.  3. Mild paranasal sinus inflammatory changes.    MRA NECK AND [place] OF [place]:  1. Dissection appreciated association with the right internal carotid artery with the proximal extent appreciated just beyond the bulb of the right internal carotid artery. The right internal carotid artery dissection persists distally to the level of the skull base/proximal petrous right internal carotid artery.  2. Findings also include suggestion of short segment stenosis involving an M2 branch of the right middle cerebral artery.  3. No evidence of aneurysm formation at the level of the [place] of [place].  4. Fetal origin of the left posterior cerebral artery.  5. Prominent right posterior communicating artery contributes to flow within the right posterior cerebral artery.  6. No evidence of aneurysm formation at the level of the [place] of [place].    8/23/2023  IMPRESSION:  CTA Neck: Findings most compatible with right cervical ICA dissection extending to the skull base. Recommend further evaluation with MRA of the neck and MRI/MRA of the brain.  CTA Head: No large vessel occlusion, significant stenosis, dissection, or saccular aneurysm.   Preliminary note, official note pending attending review/signature.                               [facility] Stroke Team    CC: headache     HPI:  Patient seen/examined prior to [facility] on 8/23/23.    63M with PMHX MDD, Anxiety, ?Bipolar, BPH, Tobacco/THC Abuse, ETOH Abuse s/p [place] [facility] x 10 days [place] [facility] [place] (x 1-2 weeks) and noted Blurry Vision. CTH negative. CTA Head/Neck concerning for possible Right  ICA Dissection. MRI Brain, MRA Head/Neck performed at [facility] confirming R ICA Dissection. Patient loaded with ASA/Plavix at [facility] and transferred to [facility] for Neuro IR eval. Also noted to have episode of hypoglycemia at [facility] which resolved and was also c/o epigastric abd pain with nausea for which CT ABD/PEL revealed dilated pancreatic duct.   [facility] ED and [facility] ED discussed case with [facility] MICU and [facility] Neuro IR. No other complaints.    PAST MEDICAL & SURGICAL HISTORY:  Anxiety  MDD  ?bipolar  Etoh use (states " alcoholic and addict")  nicotine use     MEDICATIONS  (STANDING):  clopidogrel Tablet 75 milliGRAM(s) Oral daily  famotidine    Tablet 20 milliGRAM(s) Oral two times a day  heparin   Injectable 5000 Unit(s) SubCutaneous every 12 hours  lactated ringers. 1000 milliLiter(s) (75 mL/Hr) IV Continuous <Continuous>  QUEtiapine 25 milliGRAM(s) Oral <User Schedule>  QUEtiapine 100 milliGRAM(s) Oral at bedtime  tamsulosin 0.4 milliGRAM(s) Oral at bedtime  traZODone 50 milliGRAM(s) Oral at bedtime    MEDICATIONS  (PRN):  acetaminophen     Tablet .. 650 milliGRAM(s) Oral every 6 hours PRN Temp greater or equal to 38C (100.4F), Mild Pain (1 - 3)  aluminum hydroxide/magnesium hydroxide/simethicone Suspension 30 milliLiter(s) Oral every 4 hours PRN Dyspepsia  hydrALAZINE Injectable 5 milliGRAM(s) IV Push every 4 hours PRN SBP > 140  hydrOXYzine hydrochloride 50 milliGRAM(s) Oral two times a day PRN Anxiety  melatonin 3 milliGRAM(s) Oral at bedtime PRN Insomnia  ondansetron Injectable 4 milliGRAM(s) IV Push every 8 hours PRN Nausea and/or Vomiting    Allergies  Denies     Intolerances    SOCIAL HISTORY:   tob,    alcohol   no drugs    FAMILY HISTORY:  noted family hx of aneurysm ?   ESRD in sibling       ROS: 14 point ROS negative other than what is present in HPI or below. States first morning he woke up without a headache.     Vital Signs Last 24 Hrs  T(C): 36.6 (24 Aug 2023 13:47), Max: 36.8 (23 Aug 2023 22:35)  T(F): 97.8 (24 Aug 2023 13:47), Max: 98.2 (23 Aug 2023 22:35)  HR: 58 (24 Aug 2023 12:00) (46 - 80)  BP: 136/80 (24 Aug 2023 12:00) (104/58 - 163/99)  BP(mean): 88 (24 Aug 2023 06:00) (87 - 93)  RR: 16 (24 Aug 2023 12:00) (15 - 18)  SpO2: 98% (24 Aug 2023 12:00) (94% - 98%)    Parameters below as of 24 Aug 2023 12:00  Patient On (Oxygen Delivery Method): room air          General: NAD    Detailed Neurologic Exam:    Mental status: The patient is awake and alert and has normal attention span.  The patient is fully oriented in 3 spheres. The patient is oriented to current events. The patient is able to name objects, follow commands, repeat sentences.    Cranial nerves: slight left lip depression, right eye ptosis (patient states baseline from young age, hx head trauma), right pupil 2mm reactive, left pupil 3 mm reactive,  There is no visual field deficit to confrontation. Extraocular motion is full with no nystagmus. There is no ptosis. Facial sensation is intact.  alate elevates symmetrically. Tongue is midline.    Motor: There is normal bulk and tone.  There is no tremor.  Strength is 5/5 in the right arm and leg.   Strength is 5/5 in the left arm and leg.    Sensation: Intact to light touch and pin in 4 extremities, temperature sensation intact overall and symmetric, no extinction     Cerebellar: There is no dysmetria on finger to nose testing.    Gait : deferred    NIH SS:  DATE: 8/24/23  TIME: 1135  1A: Level of consciousness (0-3):   1B: Questions (0-2):   1C: Commands (0-2):   2: Gaze (0-2):   3: Visual fields (0-3):   4: Facial palsy (0-3): 1  MOTOR:  5A: Left arm motor drift (0-4):   5B: Right arm motor drift (0-4):   6A: Left leg motor drift (0-4):   6B: Right leg motor drift (0-4):   7: Limb ataxia (0-2):   SENSORY:  8: Sensation (0-2):   SPEECH:  9: Language (0-3):   10: Dysarthria (0-2):   EXTINCTION:  11: Extinction/inattention (0-2):     TOTAL SCORE: 1    prehospital mRS= 0      LABS:                         14.8   4.45  )-----------( 176      ( 24 Aug 2023 05:14 )             44.0       08-24    140  |  106  |  12.1  ----------------------------<  76  4.2   |  24.0  |  0.88    Ca    9.0      24 Aug 2023 05:14  Phos  3.2     08-24  Mg     1.9     08-24    TPro  6.0<L>  /  Alb  3.5  /  TBili  0.8  /  DBili  x   /  AST  24  /  ALT  22  /  AlkPhos  53  08-24          Lipid panel: pending      HgbA1c: pending       RADIOLOGY & ADDITIONAL STUDIES (independently reviewed unless otherwise noted):  PROCEDURE DATE:  08/23/2023  MRI BRAIN:  1. Several small nonspecific foci of T2 increased signal on FLAIR acquisition imaging bilaterally at the level the corona radiata and centrum semiovale ovale, with the most prominent noted on the left at the level the corona radiata, measuring approximately 7.5 mm in width x 4 mm in AP diameter, which may represent a prior infectious, inflammatory, ischemic, or demyelinating process.   No acute intracranial pathology. Specifically, no restricted diffusion. No acute ischemic event.  2. No abnormal intraparenchymal or leptomeningeal enhancement.  3. Mild paranasal sinus inflammatory changes.    MRA NECK AND [place] OF [place]:  1. Dissection appreciated association with the right internal carotid artery with the proximal extent appreciated just beyond the bulb of the right internal carotid artery. The right internal carotid artery dissection persists distally to the level of the skull base/proximal petrous right internal carotid artery.  2. Findings also include suggestion of short segment stenosis involving an M2 branch of the right middle cerebral artery.  3. No evidence of aneurysm formation at the level of the [place] of [place].  4. Fetal origin of the left posterior cerebral artery.  5. Prominent right posterior communicating artery contributes to flow within the right posterior cerebral artery.  6. No evidence of aneurysm formation at the level of the [place] of [place].    8/23/2023  IMPRESSION:  CTA Neck: Findings most compatible with right cervical ICA dissection extending to the skull base. Recommend further evaluation with MRA of the neck and MRI/MRA of the brain.  CTA Head: No large vessel occlusion, significant stenosis, dissection, or saccular aneurysm.            [facility] Stroke Team    CC: headache     HPI:  Patient seen/examined prior to [facility] on 8/23/23.    63M with PMHX MDD, Anxiety, ?Bipolar, BPH, Tobacco/THC Abuse, ETOH Abuse s/p [place] Detox x 10 days presented to [facility] c/o HA (x 1-2 weeks) and noted Blurry Vision. CTH negative. CTA Head/Neck concerning for possible Right  ICA Dissection. MRI Brain, MRA Head/Neck performed at [facility] confirming R ICA Dissection. Patient loaded with ASA/Plavix at [facility] and transferred to [facility] for Neuro IR eval. Also noted to have episode of hypoglycemia at [facility] which resolved and was also c/o epigastric abd pain with nausea for which CT ABD/PEL revealed dilated pancreatic duct.   [facility] ED and [facility] ED discussed case with [facility] MICU and [facility] Neuro IR. No other complaints.    PAST MEDICAL & SURGICAL HISTORY:  Anxiety  MDD  ?bipolar  Etoh use (states " alcoholic and addict")  nicotine use     MEDICATIONS  (STANDING):  clopidogrel Tablet 75 milliGRAM(s) Oral daily  famotidine    Tablet 20 milliGRAM(s) Oral two times a day  heparin   Injectable 5000 Unit(s) SubCutaneous every 12 hours  lactated ringers. 1000 milliLiter(s) (75 mL/Hr) IV Continuous <Continuous>  QUEtiapine 25 milliGRAM(s) Oral <User Schedule>  QUEtiapine 100 milliGRAM(s) Oral at bedtime  tamsulosin 0.4 milliGRAM(s) Oral at bedtime  traZODone 50 milliGRAM(s) Oral at bedtime    MEDICATIONS  (PRN):  acetaminophen     Tablet .. 650 milliGRAM(s) Oral every 6 hours PRN Temp greater or equal to 38C (100.4F), Mild Pain (1 - 3)  aluminum hydroxide/magnesium hydroxide/simethicone Suspension 30 milliLiter(s) Oral every 4 hours PRN Dyspepsia  hydrALAZINE Injectable 5 milliGRAM(s) IV Push every 4 hours PRN SBP > 140  hydrOXYzine hydrochloride 50 milliGRAM(s) Oral two times a day PRN Anxiety  melatonin 3 milliGRAM(s) Oral at bedtime PRN Insomnia  ondansetron Injectable 4 milliGRAM(s) IV Push every 8 hours PRN Nausea and/or Vomiting    Allergies  Denies     Intolerances    SOCIAL HISTORY:   tob,    alcohol   no drugs    FAMILY HISTORY:  noted family hx of aneurysm ?   ESRD in sibling       ROS: 14 point ROS negative other than what is present in HPI or below. States first morning he woke up without a headache.     Vital Signs Last 24 Hrs  T(C): 36.6 (24 Aug 2023 13:47), Max: 36.8 (23 Aug 2023 22:35)  T(F): 97.8 (24 Aug 2023 13:47), Max: 98.2 (23 Aug 2023 22:35)  HR: 58 (24 Aug 2023 12:00) (46 - 80)  BP: 136/80 (24 Aug 2023 12:00) (104/58 - 163/99)  BP(mean): 88 (24 Aug 2023 06:00) (87 - 93)  RR: 16 (24 Aug 2023 12:00) (15 - 18)  SpO2: 98% (24 Aug 2023 12:00) (94% - 98%)    Parameters below as of 24 Aug 2023 12:00  Patient On (Oxygen Delivery Method): room air          General: NAD    Detailed Neurologic Exam:    Mental status: The patient is awake and alert and has normal attention span.  The patient is fully oriented in 3 spheres. The patient is oriented to current events. The patient is able to name objects, follow commands, repeat sentences.    Cranial nerves: slight left lip depression, right eye ptosis (patient states baseline from young age, hx head trauma), right pupil 2mm reactive, left pupil 3 mm reactive,  There is no visual field deficit to confrontation. Extraocular motion is full with no nystagmus. There is no ptosis. Facial sensation is intact.  alate elevates symmetrically. Tongue is midline.    Motor: There is normal bulk and tone.  There is no tremor.  Strength is 5/5 in the right arm and leg.   Strength is 5/5 in the left arm and leg.    Sensation: Intact to light touch and pin in 4 extremities, temperature sensation intact overall and symmetric, no extinction     Cerebellar: There is no dysmetria on finger to nose testing.    Gait : deferred    [facility] SS:  DATE: 8/24/23  TIME: 1135  1A: Level of consciousness (0-3):   1B: Questions (0-2):   1C: Commands (0-2):   2: Gaze (0-2):   3: Visual fields (0-3):   4: Facial palsy (0-3): 1  MOTOR:  5A: Left arm motor drift (0-4):   5B: Right arm motor drift (0-4):   6A: Left leg motor drift (0-4):   6B: Right leg motor drift (0-4):   7: Limb ataxia (0-2):   SENSORY:  8: Sensation (0-2):   SPEECH:  9: Language (0-3):   10: Dysarthria (0-2):   EXTINCTION:  11: Extinction/inattention (0-2):     TOTAL SCORE: 1    prehospital mRS= 0      LABS:                         14.8   4.45  )-----------( 176      ( 24 Aug 2023 05:14 )             44.0       08-24    140  |  106  |  12.1  ----------------------------<  76  4.2   |  24.0  |  0.88    Ca    9.0      24 Aug 2023 05:14  Phos  3.2     08-24  Mg     1.9     08-24    TPro  6.0<L>  /  Alb  3.5  /  TBili  0.8  /  DBili  x   /  AST  24  /  ALT  22  /  AlkPhos  53  08-24          Lipid panel: pending      HgbA1c: pending       RADIOLOGY & ADDITIONAL STUDIES (independently reviewed unless otherwise noted):  PROCEDURE DATE:  08/23/2023  MRI BRAIN:  1. Several small nonspecific foci of T2 increased signal on FLAIR acquisition imaging bilaterally at the level the corona radiata and centrum semiovale ovale, with the most prominent noted on the left at the level the corona radiata, measuring approximately 7.5 mm in width x 4 mm in AP diameter, which may represent a prior infectious, inflammatory, ischemic, or demyelinating process.   No acute intracranial pathology. Specifically, no restricted diffusion. No acute ischemic event.  2. No abnormal intraparenchymal or leptomeningeal enhancement.  3. Mild paranasal sinus inflammatory changes.    MRA NECK AND [place] OF [place]:  1. Dissection appreciated association with the right internal carotid artery with the proximal extent appreciated just beyond the bulb of the right internal carotid artery. The right internal carotid artery dissection persists distally to the level of the skull base/proximal petrous right internal carotid artery.  2. Findings also include suggestion of short segment stenosis involving an M2 branch of the right middle cerebral artery.  3. No evidence of aneurysm formation at the level of the [place] of [place].  4. Fetal origin of the left posterior cerebral artery.  5. Prominent right posterior communicating artery contributes to flow within the right posterior cerebral artery.  6. No evidence of aneurysm formation at the level of the [place] of [place].    8/23/2023  IMPRESSION:  CTA Neck: Findings most compatible with right cervical ICA dissection extending to the skull base. Recommend further evaluation with MRA of the neck and MRI/MRA of the brain.  CTA Head: No large vessel occlusion, significant stenosis, dissection, or saccular aneurysm.            [facility] Stroke Team    CC: headache     HPI:  Patient seen/examined prior to [facility] on 8/23/23.    63M with PMHX MDD, Anxiety, ?Bipolar, BPH, Tobacco/THC Abuse, ETOH Abuse s/p [place] [facility] x 10 days [place] [facility] [place] 1-2 weeks[place] and noted Blurry Vision. CTH negative. CTA Head/Neck concerning for possible Right  ICA Dissection. MRI Brain, MRA Head[place] [facility] [place]CA Dissection. [place] [facility] [place] and [place] to [facility] for Neuro IR eval. Also noted to have episode of [place]cemia [place] [facility] [place] which [place] and was also c/o epigastric abd pain with nausea for which CT ABD/PEL revealed dilated pancreatic duct.   [facility] [place] and [facility] ED discussed case with [facility] [place] and [facility] Neuro IR. No other complaints.    PAST MEDICAL & SURGICAL HISTORY:  Anxiety  MDD  ?bipolar  Etoh use (states " alcoholic and addict")  nicotine use     MEDICATIONS  (STANDING):  clopidogrel Tablet 75 milliGRAM(s) Oral daily  famotidine    Tablet 20 milliGRAM(s) Oral two times a day  heparin   Injectable 5000 Unit(s) SubCutaneous every 12 hours  lactated ringers. 1000 milliLiter(s) (75 mL/Hr) IV Continuous <Continuous>  QUEtiapine 25 milliGRAM(s) Oral <User Schedule>  QUEtiapine 100 milliGRAM(s) Oral at bedtime  tamsulosin 0.4 milliGRAM(s) Oral at bedtime  traZODone 50 milliGRAM(s) Oral at bedtime    MEDICATIONS  (PRN):  acetaminophen     Tablet .. 650 milliGRAM(s) Oral every 6 hours PRN Temp greater or equal to 38C (100.4F), Mild Pain (1 - 3)  aluminum hydroxide/magnesium hydroxide/simethicone Suspension 30 milliLiter(s) Oral every 4 hours PRN Dyspepsia  hydrALAZINE Injectable 5 milliGRAM(s) IV Push every 4 hours PRN SBP > 140  hydrOXYzine hydrochloride 50 milliGRAM(s) Oral two times a day PRN Anxiety  melatonin 3 milliGRAM(s) Oral at bedtime PRN Insomnia  ondansetron Injectable 4 milliGRAM(s) IV Push every 8 hours PRN Nausea and/or Vomiting    Allergies  Denies     Intolerances    SOCIAL HISTORY:   tob,    alcohol   no drugs    FAMILY HISTORY:  noted family hx of aneurysm ?   ESRD in sibling       ROS: 14 point ROS negative other than what is present in HPI or below. States first morning he woke up without a headache.     Vital Signs Last 24 Hrs  T(C): 36.6 (24 Aug 2023 13:47), Max: 36.8 (23 Aug 2023 22:35)  T(F): 97.8 (24 Aug 2023 13:47), Max: 98.2 (23 Aug 2023 22:35)  HR: 58 (24 Aug 2023 12:00) (46 - 80)  BP: 136/80 (24 Aug 2023 12:00) (104/58 - 163/99)  BP(mean): 88 (24 Aug 2023 06:00) (87 - 93)  RR: 16 (24 Aug 2023 12:00) (15 - 18)  SpO2: 98% (24 Aug 2023 12:00) (94% - 98%)    Parameters below as of 24 Aug 2023 12:00  Patient On (Oxygen Delivery Method): room air          General: NAD    Detailed Neurologic Exam:    Mental status: The patient is awake and alert and has normal attention span.  The patient is fully oriented in 3 spheres. The patient is oriented to current events. The patient is able to name objects, follow commands, repeat sentences.    Cranial nerves: slight left lip depression, right eye ptosis (patient states baseline from young age, hx head trauma), right pupil 2mm reactive, left pupil 3 mm reactive,  There is no visual field deficit to confrontation. Extraocular motion is full with no nystagmus. There is no ptosis. Facial sensation is intact.  alate elevates symmetrically. Tongue is midline.    Motor: There is normal bulk and tone.  There is no tremor.  Strength is 5/5 in the right arm and leg.   Strength is 5/5 in the left arm and leg.    Sensation: Intact to light touch and pin in 4 extremities, temperature sensation intact overall and symmetric, no extinction     Cerebellar: There is no dysmetria on finger to nose testing.    Gait : deferred    [facility] SS:  DATE: 8/24/23  TIME: 1135  1A: Level of consciousness (0-3):   1B: Questions (0-2):   1C: Commands (0-2):   2: Gaze (0-2):   3: Visual fields (0-3):   4: Facial palsy (0-3): 1  MOTOR:  5A: Left arm motor drift (0-4):   5B: Right arm motor drift (0-4):   6A: Left leg motor drift (0-4):   6B: Right leg motor drift (0-4):   7: Limb ataxia (0-2):   SENSORY:  8: Sensation (0-2):   SPEECH:  9: Language (0-3):   10: Dysarthria (0-2):   EXTINCTION:  11: Extinction/inattention (0-2):     TOTAL SCORE: 1    prehospital mRS= 0      LABS:                         14.8   4.45  )-----------( 176      ( 24 Aug 2023 05:14 )             44.0       08-24    140  |  106  |  12.1  ----------------------------<  76  4.2   |  24.0  |  0.88    Ca    9.0      24 Aug 2023 05:14  Phos  3.2     08-24  Mg     1.9     08-24    TPro  6.0<L>  /  Alb  3.5  /  TBili  0.8  /  DBili  x   /  AST  24  /  ALT  22  /  AlkPhos  53  08-24          Lipid panel: pending      HgbA1c: pending       RADIOLOGY & ADDITIONAL STUDIES (independently reviewed unless otherwise noted):  PROCEDURE DATE:  08/23/2023  MRI BRAIN:  1. Several small nonspecific foci of T2 increased signal on FLAIR acquisition imaging bilaterally at the level the corona radiata and centrum semiovale ovale, with the most prominent noted on the left at the level the corona radiata, measuring approximately 7.5 mm in width x 4 mm in AP diameter, which may represent a prior infectious, inflammatory, ischemic, or demyelinating process.   No acute intracranial pathology. Specifically, no restricted diffusion. No acute ischemic event.  2. No abnormal intraparenchymal or leptomeningeal enhancement.  3. Mild paranasal sinus inflammatory changes.    MRA NECK AND [place]:  1. Dissection appreciated association with the right internal carotid artery with the proximal extent appreciated just beyond the bulb of the right internal carotid artery. The right internal carotid artery dissection persists distally to the level of the skull base/proximal petrous right internal carotid artery.  2. Findings also include suggestion of short segment stenosis involving an M2 branch of the right middle cerebral artery.  3. No evidence of aneurysm formation at the level of the [place].  4. Fetal origin of the left posterior cerebral artery.  5. Prominent right posterior communicating artery contributes to flow within the right posterior cerebral artery.  6. No evidence of aneurysm formation at the level of the [place].    8/23/2023  IMPRESSION:  CTA Neck: Findings most compatible with right cervical ICA dissection extending to the skull base. Recommend further evaluation with MRA of the neck and MRI/MRA of the brain.  CTA Head: No large vessel occlusion, significant stenosis, dissection, or saccular aneurysm.            [facility] Stroke Team    CC: headache     HPI:  Patient seen/examined prior to [facility] on 8/23/23.    63M with PMHX MDD, Anxiety, ?Bipolar, BPH, Tobacco/THC Abuse, ETOH Abuse s/p [place] [facility] x 10 days presented to [facility] [place]/o HA (x 1-2 weeks) and noted Blurry Vision. CTH negative. CTA Head/Neck concerning for possible Right  ICA Dissection. MRI Brain, MRA Head/Neck performed at [facility] [place] confirming R ICA Dissection. Patient loaded with ASA/Plavix at [facility] [place] and transferred to [facility] for Neuro IR eval. Also noted to have episode of hypoglycemia at [facility] which resolved and was also c/o epigastric abd pain with nausea for which CT ABD/PEL revealed dilated pancreatic duct.   [facility] ED and [facility] ED discussed case with [facility] MICU and [facility] Neuro IR. No other complaints.    PAST MEDICAL & SURGICAL HISTORY:  Anxiety  MDD  ?bipolar  Etoh use (states " alcoholic and addict")  nicotine use     MEDICATIONS  (STANDING):  clopidogrel Tablet 75 milliGRAM(s) Oral daily  famotidine    Tablet 20 milliGRAM(s) Oral two times a day  heparin   Injectable 5000 Unit(s) SubCutaneous every 12 hours  lactated ringers. 1000 milliLiter(s) (75 mL/Hr) IV Continuous <Continuous>  QUEtiapine 25 milliGRAM(s) Oral <User Schedule>  QUEtiapine 100 milliGRAM(s) Oral at bedtime  tamsulosin 0.4 milliGRAM(s) Oral at bedtime  traZODone 50 milliGRAM(s) Oral at bedtime    MEDICATIONS  (PRN):  acetaminophen     Tablet .. 650 milliGRAM(s) Oral every 6 hours PRN Temp greater or equal to 38C (100.4F), Mild Pain (1 - 3)  aluminum hydroxide/magnesium hydroxide/simethicone Suspension 30 milliLiter(s) Oral every 4 hours PRN Dyspepsia  hydrALAZINE Injectable 5 milliGRAM(s) IV Push every 4 hours PRN SBP > 140  hydrOXYzine hydrochloride 50 milliGRAM(s) Oral two times a day PRN Anxiety  melatonin 3 milliGRAM(s) Oral at bedtime PRN Insomnia  ondansetron Injectable 4 milliGRAM(s) IV Push every 8 hours PRN Nausea and/or Vomiting    Allergies  Denies     Intolerances    SOCIAL HISTORY:   tob,    alcohol   no drugs    FAMILY HISTORY:  noted family hx of aneurysm ?   ESRD in sibling       ROS: 14 point ROS negative other than what is present in HPI or below. States first morning he woke up without a headache.     Vital Signs Last 24 Hrs  T(C): 36.6 (24 Aug 2023 13:47), Max: 36.8 (23 Aug 2023 22:35)  T(F): 97.8 (24 Aug 2023 13:47), Max: 98.2 (23 Aug 2023 22:35)  HR: 58 (24 Aug 2023 12:00) (46 - 80)  BP: 136/80 (24 Aug 2023 12:00) (104/58 - 163/99)  BP(mean): 88 (24 Aug 2023 06:00) (87 - 93)  RR: 16 (24 Aug 2023 12:00) (15 - 18)  SpO2: 98% (24 Aug 2023 12:00) (94% - 98%)    Parameters below as of 24 Aug 2023 12:00  Patient On (Oxygen Delivery Method): room air          General: NAD    Detailed Neurologic Exam:    Mental status: The patient is awake and alert and has normal attention span.  The patient is fully oriented in 3 spheres. The patient is oriented to current events. The patient is able to name objects, follow commands, repeat sentences.    Cranial nerves: slight left lip depression, right eye ptosis (patient states baseline from young age, hx head trauma), right pupil 2mm reactive, left pupil 3 mm reactive,  There is no visual field deficit to confrontation. Extraocular motion is full with no nystagmus. There is no ptosis. Facial sensation is intact.  alate elevates symmetrically. Tongue is midline.    Motor: There is normal bulk and tone.  There is no tremor.  Strength is 5/5 in the right arm and leg.   Strength is 5/5 in the left arm and leg.    Sensation: Intact to light touch and pin in 4 extremities, temperature sensation intact overall and symmetric, no extinction     Cerebellar: There is no dysmetria on finger to nose testing.    Gait : deferred    [facility] SS:  DATE: 8/24/23  TIME: 1135  1A: Level of consciousness (0-3):   1B: Questions (0-2):   1C: Commands (0-2):   2: Gaze (0-2):   3: Visual fields (0-3):   4: Facial palsy (0-3): 1  MOTOR:  5A: Left arm motor drift (0-4):   5B: Right arm motor drift (0-4):   6A: Left leg motor drift (0-4):   6B: Right leg motor drift (0-4):   7: Limb ataxia (0-2):   SENSORY:  8: Sensation (0-2):   SPEECH:  9: Language (0-3):   10: Dysarthria (0-2):   EXTINCTION:  11: Extinction/inattention (0-2):     TOTAL SCORE: 1    prehospital mRS= 0      LABS:                         14.8   4.45  )-----------( 176      ( 24 Aug 2023 05:14 )             44.0       08-24    140  |  106  |  12.1  ----------------------------<  76  4.2   |  24.0  |  0.88    Ca    9.0      24 Aug 2023 05:14  Phos  3.2     08-24  Mg     1.9     08-24    TPro  6.0<L>  /  Alb  3.5  /  TBili  0.8  /  DBili  x   /  AST  24  /  ALT  22  /  AlkPhos  53  08-24          Lipid panel: pending      HgbA1c: pending       RADIOLOGY & ADDITIONAL STUDIES (independently reviewed unless otherwise noted):  PROCEDURE DATE:  08/23/2023  MRI BRAIN:  1. Several small nonspecific foci of T2 increased signal on FLAIR acquisition imaging bilaterally at the level the corona radiata and centrum semiovale ovale, with the most prominent noted on the left at the level the corona radiata, measuring approximately 7.5 mm in width x 4 mm in AP diameter, which may represent a prior infectious, inflammatory, ischemic, or demyelinating process.   No acute intracranial pathology. Specifically, no restricted diffusion. No acute ischemic event.  2. No abnormal intraparenchymal or leptomeningeal enhancement.  3. Mild paranasal sinus inflammatory changes.    MRA NECK AND [place]:  1. Dissection appreciated association with the right internal carotid artery with the proximal extent appreciated just beyond the bulb of the right internal carotid artery. The right internal carotid artery dissection persists distally to the level of the skull base/proximal petrous right internal carotid artery.  2. Findings also include suggestion of short segment stenosis involving an M2 branch of the right middle cerebral artery.  3. No evidence of aneurysm formation at the level of the [place].  4. Fetal origin of the left posterior cerebral artery.  5. Prominent right posterior communicating artery contributes to flow within the right posterior cerebral artery.  6. No evidence of aneurysm formation at the level of the [place].    8/23/2023  IMPRESSION:  CTA Neck: Findings most compatible with right cervical ICA dissection extending to the skull base. Recommend further evaluation with MRA of the neck and MRI/MRA of the brain.  CTA Head: No large vessel occlusion, significant stenosis, dissection, or saccular aneurysm.

## 2023-08-24 NOTE — CONSULT NOTE ADULT - ASSESSMENT
63 year old male with a history of polysubstance abuse who presented with right ICA dissection with incidental PD dilation    PD dilation   Rule out malignancy, chronic pancreatitis or main duct IPMN  Recommend MRI/MRCP with and without contrast  He may need anxiolytics and a condom cath prior to MRI and he had anxiety with the MRI and has frequent urination   Check Ca 19-9

## 2023-08-24 NOTE — PROGRESS NOTE ADULT - SUBJECTIVE AND OBJECTIVE BOX
HPI  Pt is a 64yo M transfer from [facility] [place] for right ICA disection and dilated pancreatic duct  Pt was seen and examined at bedside. Denies of any HA. Pending neuro IR for possible intervention. hypoglycemia noted again     Vital Signs Last 24 Hrs  T(C): 36.8 (23 Aug 2023 22:35), Max: 36.8 (23 Aug 2023 22:35)  T(F): 98.2 (23 Aug 2023 22:35), Max: 98.2 (23 Aug 2023 22:35)  HR: 73 (24 Aug 2023 10:00) (46 - 80)  BP: 122/87 (24 Aug 2023 10:00) (104/58 - 163/99)  BP(mean): 88 (24 Aug 2023 06:00) (87 - 93)  RR: 16 (24 Aug 2023 10:00) (15 - 18)  SpO2: 98% (24 Aug 2023 10:00) (94% - 98%)    Parameters below as of 24 Aug 2023 10:00  Patient On (Oxygen Delivery Method): room air        I&O's Summary    24 Aug 2023 07:01  -  24 Aug 2023 11:36  --------------------------------------------------------  IN: 377 mL / OUT: 0 mL / NET: 377 mL        CAPILLARY BLOOD GLUCOSE      POCT Blood Glucose.: 75 mg/dL (24 Aug 2023 08:54)  POCT Blood Glucose.: 74 mg/dL (24 Aug 2023 05:53)  POCT Blood Glucose.: 86 mg/dL (24 Aug 2023 00:33)      PHYSICAL EXAM:    Constitutional: NAD,    HEENT: PERR, EOMI,   Neck: Soft and supple,    Respiratory: Breath sounds are clear bilaterally,    Cardiovascular: S1 and S2,   Gastrointestinal: Bowel Sounds present, soft,    Extremities: No peripheral edema  Vascular: 2+ peripheral pulses  Neurological: A/O x 3,    Musculoskeletal: 5/5 strength b/l upper and lower extremities  Skin: No rashes    MEDICATIONS:  MEDICATIONS  (STANDING):  aspirin  chewable 81 milliGRAM(s) Oral daily  clopidogrel Tablet 75 milliGRAM(s) Oral daily  heparin   Injectable 5000 Unit(s) SubCutaneous every 12 hours  lactated ringers. 1000 milliLiter(s) (75 mL/Hr) IV Continuous <Continuous>  QUEtiapine 25 milliGRAM(s) Oral <User Schedule>  QUEtiapine 100 milliGRAM(s) Oral at bedtime  tamsulosin 0.4 milliGRAM(s) Oral at bedtime  traZODone 50 milliGRAM(s) Oral at bedtime      LABS: All Labs Reviewed:                        14.8   4.45  )-----------( 176      ( 24 Aug 2023 05:14 )             44.0     08-24    140  |  106  |  12.1  ----------------------------<  76  4.2   |  24.0  |  0.88    Ca    9.0      24 Aug 2023 05:14  Phos  3.2     08-24  Mg     1.9     08-24    TPro  6.0<L>  /  Alb  3.5  /  TBili  0.8  /  DBili  x   /  AST  24  /  ALT  22  /  AlkPhos  53  08-24          Blood Culture:     RADIOLOGY/EKG:    DVT PPX:    ADVANCED DIRECTIVE:    DISPOSITION: HPI  Pt is a 64yo M transfer from [facility] [place] for right ICA disection and dilated pancreatic duct  Pt was seen and examined at bedside. Denies of any HA. Pending neuro IR for possible intervention. hypoglycemia noted again     Vital Signs Last 24 Hrs  T(C): 36.8 (23 Aug 2023 22:35), Max: 36.8 (23 Aug 2023 22:35)  T(F): 98.2 (23 Aug 2023 22:35), Max: 98.2 (23 Aug 2023 22:35)  HR: 73 (24 Aug 2023 10:00) (46 - 80)  BP: 122/87 (24 Aug 2023 10:00) (104/58 - 163/99)  BP(mean): 88 (24 Aug 2023 06:00) (87 - 93)  RR: 16 (24 Aug 2023 10:00) (15 - 18)  SpO2: 98% (24 Aug 2023 10:00) (94% - 98%)    Parameters below as of 24 Aug 2023 10:00  Patient On (Oxygen Delivery Method): room air        I&O's Summary    24 Aug 2023 07:01  -  24 Aug 2023 11:36  --------------------------------------------------------  IN: 377 mL / OUT: 0 mL / NET: 377 mL        CAPILLARY BLOOD GLUCOSE      POCT Blood Glucose.: 75 mg/dL (24 Aug 2023 08:54)  POCT Blood Glucose.: 74 mg/dL (24 Aug 2023 05:53)  POCT Blood Glucose.: 86 mg/dL (24 Aug 2023 00:33)      PHYSICAL EXAM:    Constitutional: NAD,    HEENT: PERR, EOMI,   Neck: Soft and supple,    Respiratory: Breath sounds are clear bilaterally,    Cardiovascular: S1 and S2,   Gastrointestinal: Bowel Sounds present, soft,    Extremities: No peripheral edema  Vascular: 2+ peripheral pulses  Neurological: A/O x 3,    Musculoskeletal: 5/5 strength b/l upper and lower extremities  Skin: No rashes    MEDICATIONS:  MEDICATIONS  (STANDING):  aspirin  chewable 81 milliGRAM(s) Oral daily  clopidogrel Tablet 75 milliGRAM(s) Oral daily  heparin   Injectable 5000 Unit(s) SubCutaneous every 12 hours  lactated ringers. 1000 milliLiter(s) (75 mL/Hr) IV Continuous <Continuous>  QUEtiapine 25 milliGRAM(s) Oral <User Schedule>  QUEtiapine 100 milliGRAM(s) Oral at bedtime  tamsulosin 0.4 milliGRAM(s) Oral at bedtime  traZODone 50 milliGRAM(s) Oral at bedtime      LABS: All Labs Reviewed:                        14.8   4.45  )-----------( 176      ( 24 Aug 2023 05:14 )             44.0     08-24    140  |  106  |  12.1  ----------------------------<  76  4.2   |  24.0  |  0.88    Ca    9.0      24 Aug 2023 05:14  Phos  3.2     08-24  Mg     1.9     08-24    TPro  6.0<L>  /  Alb  3.5  /  TBili  0.8  /  DBili  x   /  AST  24  /  ALT  22  /  AlkPhos  53  08-24          Blood Culture:     RADIOLOGY/EKG:    DVT PPX:    ADVANCED DIRECTIVE:    DISPOSITION: HPI  [place] 64[place] [facility] [place] and dilated pancreatic duct  Pt was seen and examined at bedside. Denies of any HA. Pending neuro IR for possible intervention. hypoglycemia noted again     Vital Signs Last 24 Hrs  T(C): 36.8 (23 Aug 2023 22:35), Max: 36.8 (23 Aug 2023 22:35)  T(F): 98.2 (23 Aug 2023 22:35), Max: 98.2 (23 Aug 2023 22:35)  HR: 73 (24 Aug 2023 10:00) (46 - 80)  BP: 122/87 (24 Aug 2023 10:00) (104/58 - 163/99)  BP(mean): 88 (24 Aug 2023 06:00) (87 - 93)  RR: 16 (24 Aug 2023 10:00) (15 - 18)  SpO2: 98% (24 Aug 2023 10:00) (94% - 98%)    Parameters below as of 24 Aug 2023 10:00  Patient On (Oxygen Delivery Method): room air        I&O's Summary    24 Aug 2023 07:01  -  24 Aug 2023 11:36  --------------------------------------------------------  IN: 377 mL / OUT: 0 mL / NET: 377 mL        CAPILLARY BLOOD GLUCOSE      POCT Blood Glucose.: 75 mg/dL (24 Aug 2023 08:54)  POCT Blood Glucose.: 74 mg/dL (24 Aug 2023 05:53)  POCT Blood Glucose.: 86 mg/dL (24 Aug 2023 00:33)      PHYSICAL EXAM:    Constitutional: NAD,    HEENT: PERR, EOMI,   Neck: Soft and supple,    Respiratory: Breath sounds are clear bilaterally,    Cardiovascular: S1 and S2,   Gastrointestinal: Bowel Sounds present, soft,    Extremities: No peripheral edema  Vascular: 2+ peripheral pulses  Neurological: A/O x 3,    Musculoskeletal: 5/5 strength b/l upper and lower extremities  Skin: No rashes    MEDICATIONS:  MEDICATIONS  (STANDING):  aspirin  chewable 81 milliGRAM(s) Oral daily  clopidogrel Tablet 75 milliGRAM(s) Oral daily  heparin   Injectable 5000 Unit(s) SubCutaneous every 12 hours  lactated ringers. 1000 milliLiter(s) (75 mL/Hr) IV Continuous <Continuous>  QUEtiapine 25 milliGRAM(s) Oral <User Schedule>  QUEtiapine 100 milliGRAM(s) Oral at bedtime  tamsulosin 0.4 milliGRAM(s) Oral at bedtime  traZODone 50 milliGRAM(s) Oral at bedtime      LABS: All Labs Reviewed:                        14.8   4.45  )-----------( 176      ( 24 Aug 2023 05:14 )             44.0     08-24    140  |  106  |  12.1  ----------------------------<  76  4.2   |  24.0  |  0.88    Ca    9.0      24 Aug 2023 05:14  Phos  3.2     08-24  Mg     1.9     08-24    TPro  6.0<L>  /  Alb  3.5  /  TBili  0.8  /  DBili  x   /  AST  24  /  ALT  22  /  AlkPhos  53  08-24          Blood Culture:     RADIOLOGY/EKG:    DVT PPX:    ADVANCED DIRECTIVE:    DISPOSITION:

## 2023-08-24 NOTE — CONSULT NOTE ADULT - NS ATTEND AMEND GEN_ALL_CORE FT
Physical Therapy    Patient not seen in therapy.     No PT needs identified, up ad richard per LU Rainey. Will DC home today      OBJECTIVE                         Therapy procedure time and total treatment time can be found documented on the Time Entry flowsheet   Agree with PA's assessment and plan.

## 2023-08-24 NOTE — CONSULT NOTE ADULT - ASSESSMENT
ASSESSMENT: 63M with PMHX MDD, Anxiety, ?Bipolar, BPH, Tobacco/THC Abuse, ETOH Abuse s/p [facility] x 10 days [place] [facility] [place]-2 [place]) and noted Blurry Vision, persistent sent in from rehab.  MRI without evidence of acute infarction, there are non specific flair foci as noted which should be further evaluated and followed.  MR angiogram demonstrated right ICA dissection and right MCA stenosis, fetal left PCA. Patient was loaded with ASA/Plavix at [facility] [place] and transferred to [facility] for  further evaluation.  Etiology of dissection unclear at this time, roni does not recall any trauma or falls. Does report when he sleeps he sleeps with his neck bent for extended periods of time. Can not exclude connective tissue disorder.     NEURO:   -Neurologically without acute change, reports chronic right eye ptosis, notes cataracts being followed, headache improved  -Continue close monitoring for neurologic deterioration    - Stroke neuro checks q 4 hrs, please notify on call stroke attending for any neurological changes, decline, or new/worsening symptoms immediately, patient counselled on calling 911 once discharged for stroke symptoms, BEFAST.   -  SBP goal 110-130mmHg being tolerated at this time, avoid rapid fluctuations and further hypotension   -TCD pending to further screen for HITS/embolic events  in territory of dissection as that could raise concern for embolic events    -ANTITHROMBOTIC THERAPY: hx of etoh abuse, unclear if gastritis now with GI following for noted pancreatic lesion, if not contraindicated from medical/gi standpoint single antiplatelet with Clopidogrel 75mg daily and GI ppx.   -titrate statin to LDL goal less than 70 (if can tolerate hepatically)  -MRI imaging as noted   - Further interventional radiology follow up pending findings of noted workup and clinical course.   -Dysphagia screen: pass   -TTE   -Physical therapy/OT/Speech eval/treatment.     -CARDIOVASCULAR: check TTE, cardiac monitoring w/ telemetry for now, further evaluation pending findings of noted workup                              -HEMATOLOGY: H/H without anemia, Platelets 176, patient should have all age and risk appropriate malignancy screenings with PCP or sooner if clinically suspected , GI following      DVT ppx: no acute neurological contraindication to pharmacological dvt ppx     PULMONARY:  protecting airway, saturating well     RENAL: BUN/Cr within range, monitor urine output, maintain adequate hydration       Na Goal:  135-145    ID: afebrile, no leukocytosis, monitor for si/sx of infection     OTHER:  condition and plan of care d/w patient, questions and concerns addressed. Counselling and support provided, etoh/nicotine cessation / education. Social work followup. Verbalizes/expresses full understanding.     DISPOSITION: Rehab or home depending on PT eval once stable and workup is complete      CORE MEASURES:        Admission NIHSS: 1      Tenecteplase : [] YES [x] NO      LDL/HDL/A1C: pending      Depression Screen- if depression hx and/or present      Statin Therapy: as noted      Dysphagia Screen: [x] PASS [] FAIL     Smoking [x] YES [] NO      Afib [] YES [x] NO     Stroke Education [x] YES [] NO    Obtain screening lower extremity venous ultrasound in patients who meet 1 or more of the following criteria as patient is high risk for DVT/PE on admission:   [] History of DVT/PE  [x]Hypercoagulable states (Factor V Leiden, Cancer, OCP, etc. )  [x]Prolonged immobility (hemiplegia/hemiparesis/post operative or any other extended immobilization)  [] Transferred from outside facility (Rehab or Long term care)  [] Age </= to 50 ASSESSMENT: 63M with PMHX MDD, Anxiety, ?Bipolar, BPH, Tobacco/THC Abuse, ETOH Abuse s/p [facility] x 10 days [place] [facility] [place] (x 1-2 weeks) and noted Blurry Vision, persistent sent in from rehab.  MRI without evidence of acute infarction, there are non specific flair foci as noted which should be further evaluated and followed.  MR angiogram demonstrated right ICA dissection and right MCA stenosis, fetal left PCA. [place] [facility] [place] [facility] for  further evaluation.  Etiology of dissection unclear at this time, roni does not recall any trauma or falls. Does report when he sleeps he sleeps with his neck bent for extended periods of time. Can not exclude connective tissue disorder.     NEURO:   -Neurologically without acute change, reports chronic right eye ptosis, notes cataracts being followed, headache improved  -Continue close monitoring for neurologic deterioration    - Stroke neuro checks q 4 hrs, please notify on call stroke attending for any neurological changes, decline, or new/worsening symptoms immediately, patient counselled on calling 911 once discharged for stroke symptoms, BEFAST.   -  SBP goal 110-130mmHg being tolerated at this time, avoid rapid fluctuations and further hypotension   -TCD pending to further screen for HITS/embolic events  in territory of dissection as that could raise concern for embolic events    -ANTITHROMBOTIC THERAPY: hx of etoh abuse, unclear if gastritis now with GI following for noted pancreatic lesion, if not contraindicated from medical/gi standpoint single antiplatelet with Clopidogrel 75mg daily and GI ppx.   -titrate statin to LDL goal less than 70 (if can tolerate hepatically)  -MRI imaging as noted   - Further interventional radiology follow up pending findings of noted workup and clinical course.   -Dysphagia screen: pass   -TTE   -Physical therapy/OT/Speech eval/treatment.     -CARDIOVASCULAR: check TTE, cardiac monitoring w/ telemetry for now, further evaluation pending findings of noted workup                              -HEMATOLOGY: H/H without anemia, Platelets 176, patient should have all age and risk appropriate malignancy screenings with PCP or sooner if clinically suspected , GI following      DVT ppx: no acute neurological contraindication to pharmacological dvt ppx     PULMONARY:  protecting airway, saturating well     RENAL: BUN/Cr within range, monitor urine output, maintain adequate hydration       Na Goal:  135-145    ID: afebrile, no leukocytosis, monitor for si/sx of infection     OTHER:  condition and plan of care d/w patient, questions and concerns addressed. Counselling and support provided, etoh/nicotine cessation / education. Social work followup. Verbalizes/expresses full understanding.     DISPOSITION: Rehab or home depending on PT eval once stable and workup is complete      CORE MEASURES:        Admission NIHSS: 1      Tenecteplase : [] YES [x] NO      LDL/HDL/A1C: pending      Depression Screen- if depression hx and/or present      Statin Therapy: as noted      Dysphagia Screen: [x] PASS [] FAIL     Smoking [x] YES [] NO      Afib [] YES [x] NO     Stroke Education [x] YES [] NO    Obtain screening lower extremity venous ultrasound in patients who meet 1 or more of the following criteria as patient is high risk for DVT/PE on admission:   [] History of DVT/PE  [x]Hypercoagulable states (Factor V Leiden, Cancer, OCP, etc. )  [x]Prolonged immobility (hemiplegia/hemiparesis/post operative or any other extended immobilization)  [] Transferred from outside facility (Rehab or Long term care)  [] Age </= to 50 ASSESSMENT: 63M with PMHX MDD, Anxiety, ?Bipolar, BPH, Tobacco/THC Abuse, ETOH Abuse s/p [facility] x 10 days presented to [facility] [place]/o HA (x 1-2 weeks) and noted Blurry Vision, persistent sent in from rehab.  MRI without evidence of acute infarction, there are non specific flair foci as noted which should be further evaluated and followed.  MR angiogram demonstrated right ICA dissection and right MCA stenosis, fetal left PCA. [place] [facility] [place] [facility] for  further evaluation.  Etiology of dissection unclear at this time, roni does not recall any trauma or falls. Does report when he sleeps he sleeps with his neck bent for extended periods of time. Can not exclude connective tissue disorder.     NEURO:   -Neurologically without acute change, reports chronic right eye ptosis, notes cataracts being followed, headache improved  -Continue close monitoring for neurologic deterioration    - Stroke neuro checks q 4 hrs, please notify on call stroke attending for any neurological changes, decline, or new/worsening symptoms immediately, patient counselled on calling 911 once discharged for stroke symptoms, BEFAST.   -  SBP goal 110-130mmHg being tolerated at this time, avoid rapid fluctuations and further hypotension   -TCD pending to further screen for HITS/embolic events  in territory of dissection as that could raise concern for embolic events    -ANTITHROMBOTIC THERAPY: hx of etoh abuse, unclear if gastritis now with GI following for noted pancreatic lesion, if not contraindicated from medical/gi standpoint single antiplatelet with Clopidogrel 75mg daily and GI ppx.   -titrate statin to LDL goal less than 70 (if can tolerate hepatically)  -MRI imaging as noted   - Further interventional radiology follow up pending findings of noted workup and clinical course.   -Dysphagia screen: pass   -TTE   -Physical therapy/OT/Speech eval/treatment.     -CARDIOVASCULAR: check TTE, cardiac monitoring w/ telemetry for now, further evaluation pending findings of noted workup                              -HEMATOLOGY: H/H without anemia, Platelets 176, patient should have all age and risk appropriate malignancy screenings with PCP or sooner if clinically suspected , GI following      DVT ppx: no acute neurological contraindication to pharmacological dvt ppx     PULMONARY:  protecting airway, saturating well     RENAL: BUN/Cr within range, monitor urine output, maintain adequate hydration       Na Goal:  135-145    ID: afebrile, no leukocytosis, monitor for si/sx of infection     OTHER:  condition and plan of care d/w patient, questions and concerns addressed. Counselling and support provided, etoh/nicotine cessation / education. Social work followup. Verbalizes/expresses full understanding.     DISPOSITION: Rehab or home depending on PT eval once stable and workup is complete      CORE MEASURES:        Admission NIHSS: 1      Tenecteplase : [] YES [x] NO      LDL/HDL/A1C: pending      Depression Screen- if depression hx and/or present      Statin Therapy: as noted      Dysphagia Screen: [x] PASS [] FAIL     Smoking [x] YES [] NO      Afib [] YES [x] NO     Stroke Education [x] YES [] NO    Obtain screening lower extremity venous ultrasound in patients who meet 1 or more of the following criteria as patient is high risk for DVT/PE on admission:   [] History of DVT/PE  [x]Hypercoagulable states (Factor V Leiden, Cancer, OCP, etc. )  [x]Prolonged immobility (hemiplegia/hemiparesis/post operative or any other extended immobilization)  [] Transferred from outside facility (Rehab or Long term care)  [] Age </= to 50 ASSESSMENT: 63M with PMHX MDD, Anxiety, ?Bipolar, BPH, Tobacco/THC Abuse, ETOH Abuse s/p [facility] x 10 days presented to [facility] [place] c/o HA (x 1-2 weeks) and noted Blurry Vision, persistent sent in from rehab.  MRI without evidence of acute infarction, there are non specific flair foci as noted which should be further evaluated and followed.  MR angiogram demonstrated right ICA dissection and right MCA stenosis, fetal left PCA. Patient was loaded with ASA/Plavix at [facility] [place] and transferred to [facility] for  further evaluation.  Etiology of dissection unclear at this time, roni does not recall any trauma or falls. Does report when he sleeps he sleeps with his neck bent for extended periods of time. Can not exclude connective tissue disorder.     NEURO:   -Neurologically without acute change, reports chronic right eye ptosis, notes cataracts being followed, headache improved  -Continue close monitoring for neurologic deterioration    - Stroke neuro checks q 4 hrs, please notify on call stroke attending for any neurological changes, decline, or new/worsening symptoms immediately, patient counselled on calling 911 once discharged for stroke symptoms, BEFAST.   -  SBP goal 110-130mmHg being tolerated at this time, avoid rapid fluctuations and further hypotension   - TCD pending to further screen for HITS/embolic events  in territory of dissection as that could raise concern for embolic events    -ANTITHROMBOTIC THERAPY: hx of etoh abuse, unclear if gastritis now with GI following for noted pancreatic lesion, if not contraindicated from medical/gi standpoint single antiplatelet with Clopidogrel 75mg daily and GI ppx.   -titrate statin to LDL goal less than 70 (if can tolerate hepatically)  -MRI imaging as noted   - Further interventional radiology follow up pending findings of noted workup and clinical course.   -Dysphagia screen: pass   -TTE   -Physical therapy/OT/Speech eval/treatment.     -CARDIOVASCULAR: check TTE, cardiac monitoring w/ telemetry for now, further evaluation pending findings of noted workup                              -HEMATOLOGY: H/H without anemia, Platelets 176, patient should have all age and risk appropriate malignancy screenings with PCP or sooner if clinically suspected , GI following      DVT ppx: no acute neurological contraindication to pharmacological dvt ppx     PULMONARY:  protecting airway, saturating well     RENAL: BUN/Cr within range, monitor urine output, maintain adequate hydration       Na Goal:  135-145    ID: afebrile, no leukocytosis, monitor for si/sx of infection     OTHER:  condition and plan of care d/w patient, questions and concerns addressed. Counselling and support provided, etoh/nicotine cessation / education. Social work followup. Verbalizes/expresses full understanding.     DISPOSITION: Rehab or home depending on PT eval once stable and workup is complete      CORE MEASURES:        Admission NIHSS: 1      Tenecteplase : [] YES [x] NO      LDL/HDL/A1C: pending      Depression Screen- if depression hx and/or present      Statin Therapy: as noted      Dysphagia Screen: [x] PASS [] FAIL     Smoking [x] YES [] NO      Afib [] YES [x] NO     Stroke Education [x] YES [] NO    Obtain screening lower extremity venous ultrasound in patients who meet 1 or more of the following criteria as patient is high risk for DVT/PE on admission:   [] History of DVT/PE  [x]Hypercoagulable states (Factor V Leiden, Cancer, OCP, etc. )  [x]Prolonged immobility (hemiplegia/hemiparesis/post operative or any other extended immobilization)  [] Transferred from outside facility (Rehab or Long term care)  [] Age </= to 50 ASSESSMENT: 63M with PMHX MDD, Anxiety, ?Bipolar, BPH, Tobacco/THC Abuse, ETOH Abuse s/p [facility] x 10 days presented to [facility] [place] [place]/o HA (x 1-2 weeks) and noted Blurry Vision, persistent sent in from rehab.  MRI without evidence of acute infarction, there are non specific flair foci as noted which should be further evaluated and followed.  MR angiogram demonstrated right ICA dissection and right MCA stenosis, fetal left PCA. Patient was loaded with ASA/Plavix at [facility] [place] and transferred to [facility] for  further evaluation.  Etiology of dissection unclear at this time, rnoi does not recall any trauma or falls. Does report when he sleeps he sleeps with his neck bent for extended periods of time. Can not exclude connective tissue disorder.     NEURO:   -Neurologically without acute change, reports chronic right eye ptosis, notes cataracts being followed, headache improved  -Continue close monitoring for neurologic deterioration    - Stroke neuro checks q 4 hrs, please notify on call stroke attending for any neurological changes, decline, or new/worsening symptoms immediately, patient counselled on calling 911 once discharged for stroke symptoms, BEFAST.   -  SBP goal 110-130mmHg being tolerated at this time, avoid rapid fluctuations and further hypotension   - TCD pending to further screen for HITS/embolic events  in territory of dissection as that could raise concern for embolic events    -ANTITHROMBOTIC THERAPY: hx of etoh abuse, unclear if gastritis now with GI following for noted pancreatic lesion, if not contraindicated from medical/gi standpoint single antiplatelet with Clopidogrel 75mg daily and GI ppx.   -titrate statin to LDL goal less than 70 (if can tolerate hepatically)  -MRI imaging as noted   - Further interventional radiology follow up pending findings of noted workup and clinical course.   -Dysphagia screen: pass   -TTE   -Physical therapy/OT/Speech eval/treatment.     -CARDIOVASCULAR: check TTE, cardiac monitoring w/ telemetry for now, further evaluation pending findings of noted workup                              -HEMATOLOGY: H/H without anemia, Platelets 176, patient should have all age and risk appropriate malignancy screenings with PCP or sooner if clinically suspected , GI following      DVT ppx: no acute neurological contraindication to pharmacological dvt ppx     PULMONARY:  protecting airway, saturating well     RENAL: BUN/Cr within range, monitor urine output, maintain adequate hydration       Na Goal:  135-145    ID: afebrile, no leukocytosis, monitor for si/sx of infection     OTHER:  condition and plan of care d/w patient, questions and concerns addressed. Counselling and support provided, etoh/nicotine cessation / education. Social work followup. Verbalizes/expresses full understanding.     DISPOSITION: Rehab or home depending on PT eval once stable and workup is complete      CORE MEASURES:        Admission NIHSS: 1      Tenecteplase : [] YES [x] NO      LDL/HDL/A1C: pending      Depression Screen- if depression hx and/or present      Statin Therapy: as noted      Dysphagia Screen: [x] PASS [] FAIL     Smoking [x] YES [] NO      Afib [] YES [x] NO     Stroke Education [x] YES [] NO    Obtain screening lower extremity venous ultrasound in patients who meet 1 or more of the following criteria as patient is high risk for DVT/PE on admission:   [] History of DVT/PE  [x]Hypercoagulable states (Factor V Leiden, Cancer, OCP, etc. )  [x]Prolonged immobility (hemiplegia/hemiparesis/post operative or any other extended immobilization)  [] Transferred from outside facility (Rehab or Long term care)  [] Age </= to 50 ASSESSMENT: 63M with PMHX MDD, Anxiety, ?Bipolar, BPH, Tobacco/THC Abuse, ETOH Abuse s/p [facility] x 10 days presented to [facility] [place] c/o HA (x 1-2 weeks) and noted Blurry Vision, persistent sent in from rehab.  MRI without evidence of acute infarction, there are non specific flair foci as noted which should be further evaluated and followed.  MR angiogram demonstrated right ICA dissection and right MCA stenosis, fetal left PCA. Patient was loaded with [place] [facility] [place] and transferred to [facility] for  further evaluation.  Etiology of dissection unclear at this time, roni does not recall any trauma or falls. Does report when he sleeps he sleeps with his neck bent for extended periods of time. Can not exclude connective tissue disorder.     NEURO:   -Neurologically without acute change, reports chronic right eye ptosis, notes cataracts being followed, headache improved  -Continue close monitoring for neurologic deterioration    - Stroke neuro checks q 4 hrs, please notify on call stroke attending for any neurological changes, decline, or new/worsening symptoms immediately, patient counselled on calling 911 once discharged for stroke symptoms, BEFAST.   -  SBP goal 110-130mmHg being tolerated at this time, avoid rapid fluctuations and further hypotension   - TCD pending to further screen for HITS/embolic events  in territory of dissection as that could raise concern for embolic events    -ANTITHROMBOTIC THERAPY: hx of etoh abuse, unclear if gastritis now with GI following for noted pancreatic lesion, if not contraindicated from medical/gi standpoint single antiplatelet with Clopidogrel 75mg daily and GI ppx.   -titrate statin to LDL goal less than 70 (if can tolerate hepatically)  -MRI imaging as noted   - Further interventional radiology follow up pending findings of noted workup and clinical course.   -Dysphagia screen: pass   -TTE   -Physical therapy/OT/Speech eval/treatment.     -CARDIOVASCULAR: check TTE, cardiac monitoring w/ telemetry for now, further evaluation pending findings of noted workup                              -HEMATOLOGY: H/H without anemia, Platelets 176, patient should have all age and risk appropriate malignancy screenings with PCP or sooner if clinically suspected , GI following      DVT ppx: no acute neurological contraindication to pharmacological dvt ppx     PULMONARY:  protecting airway, saturating well     RENAL: BUN/Cr within range, monitor urine output, maintain adequate hydration       Na Goal:  135-145    ID: afebrile, no leukocytosis, monitor for si/sx of infection     OTHER:  condition and plan of care d/w patient, questions and concerns addressed. Counselling and support provided, etoh/nicotine cessation / education. Social work followup. Verbalizes/expresses full understanding.     DISPOSITION: Rehab or home depending on PT eval once stable and workup is complete      CORE MEASURES:        Admission NIHSS: 1      Tenecteplase : [] YES [x] NO      LDL/HDL/A1C: pending      Depression Screen- if depression hx and/or present      Statin Therapy: as noted      Dysphagia Screen: [x] PASS [] FAIL     Smoking [x] YES [] NO      Afib [] YES [x] NO     Stroke Education [x] YES [] NO    Obtain screening lower extremity venous ultrasound in patients who meet 1 or more of the following criteria as patient is high risk for DVT/PE on admission:   [] History of DVT/PE  [x]Hypercoagulable states (Factor V Leiden, Cancer, OCP, etc. )  [x]Prolonged immobility (hemiplegia/hemiparesis/post operative or any other extended immobilization)  [] Transferred from outside facility (Rehab or Long term care)  [] Age </= to 50

## 2023-08-24 NOTE — PROGRESS NOTE ADULT - ASSESSMENT
63M with PMHX MDD, Anxiety, ?Bipolar, BPH, Tobacco/THC Abuse, ETOH Abuse s/p [facility] x9 days presents to [facility] [place] c/o HA and Blurry Vision admitted for R ICA Dissection also found to have episode of Hypoglycemia and Epigastric Pain/Dilated Pancreatic Duct.    *R ICA Dissection  NIHSS 0   Neurochecks q2  IVF LR 75cc/hr  Goal SBP <140   Hydralazine 5mg IV PRN SBP >140. Avoid BB with Bradycardia.  ASA 325mg PO and Plavix 300mg x1 Loaded at [facility] [place]  Continue ASA 81mg q24  Plavix 75mg q24  No need for heparin gtt given symptoms >1 month duration per Neuro IR   VTE PPX SCD/SQH  Neuro IR consulted pending     *Bradycardia  Monitor Telemetry. Check EKG.  Hydralazine 5mg PRN SBP >140  Avoid labetalol with bradycardia  BP stable. Asymptomatic. Monitor VS q2    *Hypoglycemia  One episode at [facility] [place] resolved PTA  Accuchecks q4  currently still NPO pending Neuro IR     *Incidental Dilated Pancreatic Duct r/o Pancreatitis v Underlying Pathology  Seen on CTAP from [facility] [place] 8/23  LFTs/Lipase noted  GI consult appreciate it   MRCP ordered     *Incidental Nodular Opacity  1.1cm nodular opactiy on ct Abd/Pel  Outpatient F/u for repeat CT Chest on discharge    *MDD, Anxiety, ?Bipolar Disorder  Hydroxyzine 50mg BID PRN Anxiety  Quetiapine 25mg qAM + 100mg qPM  Trazodone 50mg qHS    *BPH  Flomax 0.4mg qHS    *ETOH Abuse, THC Abuse, Tobacco Abuse  s/p [facility]. Unclear if patient was on treatment taper.  No signs/symptoms of withdrawal at this time. Monitor closely in case repeat CIWA protocol required.   Cessation     63M with PMHX MDD, Anxiety, ?Bipolar, BPH, Tobacco/THC Abuse, ETOH Abuse s/p [facility] x[facility] days [place] [facility] and Blurry Vision admitted for R ICA Dissection also found to have episode of Hypoglycemia and Epigastric Pain/Dilated Pancreatic Duct.    *R ICA Dissection  NIHSS 0   Neurochecks q2  IVF LR 75cc/hr  Goal SBP <140   Hydralazine 5mg IV PRN SBP >140. Avoid BB with Bradycardia.  ASA 325mg PO and Plavix 300mg x1 [place]ed [place] [facility]  Continue ASA 81mg q24  Plavix 75mg q24  No need for heparin gtt given symptoms >1 month duration per Neuro IR   VTE PPX SCD/SQH  Neuro IR consulted pending     *Bradycardia  Monitor Telemetry. Check EKG.  Hydralazine 5mg PRN SBP >140  Avoid labetalol with bradycardia  BP stable. Asymptomatic. Monitor VS q2    *Hypoglycemia  One episode at [facility] [place] PTA  Accuchecks q4  currently still NPO pending Neuro IR     *Incidental Dilated Pancreatic Duct r/o Pancreatitis v Underlying Pathology  Seen on CTAP from [facility] [place]  LFTs/Lipase noted  GI consult appreciate it   MRCP ordered     *Incidental Nodular Opacity  1.1cm nodular opactiy on ct Abd/Pel  Outpatient F/u for repeat CT Chest on discharge    *MDD, Anxiety, ?Bipolar Disorder  Hydroxyzine 50mg BID PRN Anxiety  Quetiapine 25mg qAM + 100mg qPM  Trazodone 50mg qHS    *BPH  Flomax 0.4mg qHS    *ETOH Abuse, THC Abuse, Tobacco Abuse  s/p [facility]. Unclear if patient was on treatment taper.  No signs/symptoms of withdrawal at this time. Monitor closely in case repeat CIWA protocol required.   Cessation     63M with PMHX MDD, Anxiety, ?Bipolar, BPH, Tobacco/THC Abuse, ETOH Abuse s/p [facility] x[facility] days presents to [facility] [place] c/o HA and Blurry Vision admitted for R ICA Dissection also found to have episode of Hypoglycemia and Epigastric Pain/Dilated Pancreatic Duct.    *R ICA Dissection  NIHSS 0   Neurochecks q2  IVF LR 75cc/hr  Goal SBP <140   Hydralazine 5mg IV PRN SBP >140. Avoid BB with Bradycardia.  ASA 325mg PO and Plavix 300mg x1 Loaded at [facility] [place]  Continue ASA 81mg q24  Plavix 75mg q24  No need for heparin gtt given symptoms >1 month duration per Neuro IR   VTE PPX SCD/SQH  Neuro IR consulted pending     *Bradycardia  Monitor Telemetry. Check EKG.  Hydralazine 5mg PRN SBP >140  Avoid labetalol with bradycardia  BP stable. Asymptomatic. Monitor VS q2    *Hypoglycemia  One episode at [facility] resolved PTA  Accuchecks q4  currently still NPO pending Neuro IR     *Incidental Dilated Pancreatic Duct r/o Pancreatitis v Underlying Pathology  Seen on CTAP from [facility] 8/23  LFTs/Lipase noted  GI consult appreciate it   MRCP ordered     *Incidental Nodular Opacity  1.1cm nodular opactiy on ct Abd/Pel  Outpatient F/u for repeat CT Chest on discharge    *MDD, Anxiety, ?Bipolar Disorder  Hydroxyzine 50mg BID PRN Anxiety  Quetiapine 25mg qAM + 100mg qPM  Trazodone 50mg qHS    *BPH  Flomax 0.4mg qHS    *ETOH Abuse, THC Abuse, Tobacco Abuse  s/p [facility]. Unclear if patient was on treatment taper.  No signs/symptoms of withdrawal at this time. Monitor closely in case repeat CIWA protocol required.   Cessation     63M with PMHX MDD, Anxiety, ?Bipolar, BPH, Tobacco/THC Abuse, ETOH Abuse s/p [facility] x[facility] days presents to [facility] [place] c/o HA and Blurry Vision admitted for R ICA Dissection also found to have episode of Hypoglycemia and Epigastric Pain/Dilated Pancreatic Duct.    *R ICA Dissection  NIHSS 0   Neurochecks q2  IVF LR 75cc/hr  Goal SBP <140   Hydralazine 5mg IV PRN SBP >140. Avoid BB with Bradycardia.  ASA 325mg PO and Plavix 300mg x1 Loaded at [facility] [place]  Will DC ASA for now   Plavix 75mg q24 with Pepcid 20mg BID   No need for heparin gtt given symptoms >1 month duration per Neuro IR   VTE PPX SCD/SQH  Neuro IR consulted noted    *Bradycardia  Monitor Telemetry. Check EKG.  Hydralazine 5mg PRN SBP >140  Avoid labetalol with bradycardia  BP stable. Asymptomatic. Monitor VS q2    *Hypoglycemia  One episode at [facility] resolved PTA  Accuchecks q4  restart PO diet today     *Incidental Dilated Pancreatic Duct r/o Pancreatitis v Underlying Pathology  Seen on CTAP from [facility] 8/23  LFTs/Lipase noted  GI consult appreciate it   MRCP ordered     *Incidental Nodular Opacity  1.1cm nodular opactiy on ct Abd/Pel  Outpatient F/u for repeat CT Chest on discharge    *MDD, Anxiety, ?Bipolar Disorder  Hydroxyzine 50mg BID PRN Anxiety  Quetiapine 25mg qAM + 100mg qPM  Trazodone 50mg qHS    *BPH  Flomax 0.4mg qHS    *ETOH Abuse, THC Abuse, Tobacco Abuse  s/p [facility]. Unclear if patient was on treatment taper.  No signs/symptoms of withdrawal at this time. Monitor closely in case repeat CIWA protocol required.   Cessation     63M with PMHX MDD, Anxiety, ?Bipolar, BPH, Tobacco/THC Abuse, ETOH Abuse s/p [facility] x[facility] days [place] [facility] [place]/o HA and Blurry Vision admitted for R ICA Dissection also found to have episode of Hypoglycemia and Epigastric Pain/Dilated Pancreatic Duct.    *R ICA Dissection  NIHSS 0   Neurochecks q2  IVF LR 75cc/hr  Goal SBP <140   Hydralazine 5mg IV PRN SBP >140. Avoid BB with Bradycardia.  ASA 325mg PO and Plavix 300mg x1 Loaded at [facility] [place]  Will DC ASA for now   Plavix 75mg q24 with Pepcid 20mg BID   No need for heparin gtt given symptoms >1 month duration per Neuro IR   VTE PPX SCD/SQH  Neuro IR consulted noted    *Bradycardia  Monitor Telemetry. Check EKG.  Hydralazine 5mg PRN SBP >140  Avoid labetalol with bradycardia  BP stable. Asymptomatic. Monitor VS q2    *Hypoglycemia  One episode at [facility] [place] resolved PTA  Accuchecks q4  restart PO diet today     *Incidental Dilated Pancreatic Duct r/o Pancreatitis v Underlying Pathology  Seen on CTAP from [facility] [place] 8/23  LFTs/Lipase noted  GI consult appreciate it   MRCP ordered     *Incidental Nodular Opacity  1.1cm nodular opactiy on ct Abd/Pel  Outpatient F/u for repeat CT Chest on discharge    *MDD, Anxiety, ?Bipolar Disorder  Hydroxyzine 50mg BID PRN Anxiety  Quetiapine 25mg qAM + 100mg qPM  Trazodone 50mg qHS    *BPH  Flomax 0.4mg qHS    *ETOH Abuse, THC Abuse, Tobacco Abuse  s/p [facility]. Unclear if patient was on treatment taper.  No signs/symptoms of withdrawal at this time. Monitor closely in case repeat CIWA protocol required.   Cessation     63M with PMHX MDD, Anxiety, ?Bipolar, BPH, Tobacco/THC Abuse, ETOH Abuse s/p [facility] x[facility] days [place] [facility] [place] and Blurry Vision admitted for R ICA Dissection also found to have episode of Hypoglycemia and Epigastric Pain/Dilated Pancreatic Duct.    *R ICA Dissection  NIHSS 0   Neurochecks q2  IVF LR 75cc/hr  Goal SBP <140   Hydralazine 5mg IV PRN SBP >140. Avoid BB with Bradycardia.  ASA 325mg PO and Plavix 300mg x1 Loaded at [facility] [place]  Will DC ASA for now   Plavix 75mg q24 with Pepcid 20mg BID   No need for heparin gtt given symptoms >1 month duration per Neuro IR   VTE PPX SCD/SQH  Neuro IR consulted noted    *Bradycardia  Monitor Telemetry. Check EKG.  Hydralazine 5mg PRN SBP >140  Avoid labetalol with bradycardia  BP stable. Asymptomatic. Monitor VS q2    *Hypoglycemia  One episode at [facility] resolved PTA  Accuchecks q4  restart PO diet today     *Incidental Dilated Pancreatic Duct r/o Pancreatitis v Underlying Pathology  Seen on CTAP from [facility] [place] 8[place]  LFTs/Lipase noted  GI consult appreciate it   MRCP ordered     *Incidental Nodular Opacity  1.1cm nodular opactiy on ct Abd/Pel  Outpatient F/u for repeat CT Chest on discharge    *MDD, Anxiety, ?Bipolar Disorder  Hydroxyzine 50mg BID PRN Anxiety  Quetiapine 25mg qAM + 100mg qPM  Trazodone 50mg qHS    *BPH  Flomax 0.4mg qHS    *ETOH Abuse, THC Abuse, Tobacco Abuse  s/p [facility]. Unclear if patient was on treatment taper.  No signs/symptoms of withdrawal at this time. Monitor closely in case repeat CIWA protocol required.   Cessation

## 2023-08-25 LAB
ALBUMIN SERPL ELPH-MCNC: 3.2 G/DL — LOW (ref 3.3–5.2)
ALP SERPL-CCNC: 53 U/L — SIGNIFICANT CHANGE UP (ref 40–120)
ALT FLD-CCNC: 18 U/L — SIGNIFICANT CHANGE UP
ANION GAP SERPL CALC-SCNC: 10 MMOL/L — SIGNIFICANT CHANGE UP (ref 5–17)
AST SERPL-CCNC: 19 U/L — SIGNIFICANT CHANGE UP
BILIRUB DIRECT SERPL-MCNC: 0.2 MG/DL — SIGNIFICANT CHANGE UP (ref 0–0.3)
BILIRUB INDIRECT FLD-MCNC: 0.5 MG/DL — SIGNIFICANT CHANGE UP (ref 0.2–1)
BILIRUB SERPL-MCNC: 0.6 MG/DL — SIGNIFICANT CHANGE UP (ref 0.4–2)
BUN SERPL-MCNC: 15.5 MG/DL — SIGNIFICANT CHANGE UP (ref 8–20)
CALCIUM SERPL-MCNC: 9 MG/DL — SIGNIFICANT CHANGE UP (ref 8.4–10.5)
CANCER AG19-9 SERPL-ACNC: 17 U/ML — SIGNIFICANT CHANGE UP
CHLORIDE SERPL-SCNC: 105 MMOL/L — SIGNIFICANT CHANGE UP (ref 96–108)
CHOLEST SERPL-MCNC: 141 MG/DL — SIGNIFICANT CHANGE UP
CO2 SERPL-SCNC: 23 MMOL/L — SIGNIFICANT CHANGE UP (ref 22–29)
CREAT SERPL-MCNC: 0.96 MG/DL — SIGNIFICANT CHANGE UP (ref 0.5–1.3)
EGFR: 88 ML/MIN/1.73M2 — SIGNIFICANT CHANGE UP
GLUCOSE BLDC GLUCOMTR-MCNC: 103 MG/DL — HIGH (ref 70–99)
GLUCOSE BLDC GLUCOMTR-MCNC: 109 MG/DL — HIGH (ref 70–99)
GLUCOSE BLDC GLUCOMTR-MCNC: 76 MG/DL — SIGNIFICANT CHANGE UP (ref 70–99)
GLUCOSE BLDC GLUCOMTR-MCNC: 82 MG/DL — SIGNIFICANT CHANGE UP (ref 70–99)
GLUCOSE BLDC GLUCOMTR-MCNC: 91 MG/DL — SIGNIFICANT CHANGE UP (ref 70–99)
GLUCOSE SERPL-MCNC: 77 MG/DL — SIGNIFICANT CHANGE UP (ref 70–99)
HCT VFR BLD CALC: 43.2 % — SIGNIFICANT CHANGE UP (ref 39–50)
HDLC SERPL-MCNC: 66 MG/DL — SIGNIFICANT CHANGE UP
HGB BLD-MCNC: 14.8 G/DL — SIGNIFICANT CHANGE UP (ref 13–17)
LIDOCAIN IGE QN: 63 U/L — HIGH (ref 22–51)
LIPID PNL WITH DIRECT LDL SERPL: 65 MG/DL — SIGNIFICANT CHANGE UP
MCHC RBC-ENTMCNC: 31.6 PG — SIGNIFICANT CHANGE UP (ref 27–34)
MCHC RBC-ENTMCNC: 34.3 GM/DL — SIGNIFICANT CHANGE UP (ref 32–36)
MCV RBC AUTO: 92.1 FL — SIGNIFICANT CHANGE UP (ref 80–100)
NON HDL CHOLESTEROL: 75 MG/DL — SIGNIFICANT CHANGE UP
PLATELET # BLD AUTO: 168 K/UL — SIGNIFICANT CHANGE UP (ref 150–400)
POTASSIUM SERPL-MCNC: 4 MMOL/L — SIGNIFICANT CHANGE UP (ref 3.5–5.3)
POTASSIUM SERPL-SCNC: 4 MMOL/L — SIGNIFICANT CHANGE UP (ref 3.5–5.3)
PROT SERPL-MCNC: 5.6 G/DL — LOW (ref 6.6–8.7)
RBC # BLD: 4.69 M/UL — SIGNIFICANT CHANGE UP (ref 4.2–5.8)
RBC # FLD: 13.2 % — SIGNIFICANT CHANGE UP (ref 10.3–14.5)
SODIUM SERPL-SCNC: 138 MMOL/L — SIGNIFICANT CHANGE UP (ref 135–145)
TRIGL SERPL-MCNC: 48 MG/DL — SIGNIFICANT CHANGE UP
WBC # BLD: 3.97 K/UL — SIGNIFICANT CHANGE UP (ref 3.8–10.5)
WBC # FLD AUTO: 3.97 K/UL — SIGNIFICANT CHANGE UP (ref 3.8–10.5)

## 2023-08-25 PROCEDURE — 99232 SBSQ HOSP IP/OBS MODERATE 35: CPT

## 2023-08-25 PROCEDURE — 99233 SBSQ HOSP IP/OBS HIGH 50: CPT

## 2023-08-25 PROCEDURE — 74181 MRI ABDOMEN W/O CONTRAST: CPT | Mod: 26

## 2023-08-25 RX ORDER — SODIUM CHLORIDE 9 MG/ML
1000 INJECTION, SOLUTION INTRAVENOUS
Refills: 0 | Status: DISCONTINUED | OUTPATIENT
Start: 2023-08-25 | End: 2023-08-25

## 2023-08-25 RX ORDER — SODIUM CHLORIDE 9 MG/ML
1000 INJECTION, SOLUTION INTRAVENOUS
Refills: 0 | Status: DISCONTINUED | OUTPATIENT
Start: 2023-08-25 | End: 2023-08-26

## 2023-08-25 RX ORDER — ALPRAZOLAM 0.25 MG
0.5 TABLET ORAL ONCE
Refills: 0 | Status: DISCONTINUED | OUTPATIENT
Start: 2023-08-25 | End: 2023-08-25

## 2023-08-25 RX ADMIN — Medication 650 MILLIGRAM(S): at 01:57

## 2023-08-25 RX ADMIN — CLOPIDOGREL BISULFATE 75 MILLIGRAM(S): 75 TABLET, FILM COATED ORAL at 12:26

## 2023-08-25 RX ADMIN — Medication 50 MILLIGRAM(S): at 23:25

## 2023-08-25 RX ADMIN — HEPARIN SODIUM 5000 UNIT(S): 5000 INJECTION INTRAVENOUS; SUBCUTANEOUS at 18:21

## 2023-08-25 RX ADMIN — QUETIAPINE FUMARATE 25 MILLIGRAM(S): 200 TABLET, FILM COATED ORAL at 05:23

## 2023-08-25 RX ADMIN — FAMOTIDINE 20 MILLIGRAM(S): 10 INJECTION INTRAVENOUS at 05:23

## 2023-08-25 RX ADMIN — HEPARIN SODIUM 5000 UNIT(S): 5000 INJECTION INTRAVENOUS; SUBCUTANEOUS at 05:24

## 2023-08-25 RX ADMIN — Medication 650 MILLIGRAM(S): at 00:43

## 2023-08-25 RX ADMIN — FAMOTIDINE 20 MILLIGRAM(S): 10 INJECTION INTRAVENOUS at 18:21

## 2023-08-25 RX ADMIN — Medication 0.5 MILLIGRAM(S): at 22:10

## 2023-08-25 RX ADMIN — SODIUM CHLORIDE 75 MILLILITER(S): 9 INJECTION, SOLUTION INTRAVENOUS at 08:26

## 2023-08-25 RX ADMIN — Medication 650 MILLIGRAM(S): at 20:17

## 2023-08-25 RX ADMIN — TAMSULOSIN HYDROCHLORIDE 0.4 MILLIGRAM(S): 0.4 CAPSULE ORAL at 23:25

## 2023-08-25 NOTE — PROGRESS NOTE ADULT - SUBJECTIVE AND OBJECTIVE BOX
HPI  [place] 62[place] [facility] [place] and dilated pancreatic duct  Pt was seen and examined at bedside. Denies of any HA. Pt has no abd pain. States has anxiety for MRI     Vital Signs Last 24 Hrs  T(C): 36.7 (25 Aug 2023 12:19), Max: 36.8 (25 Aug 2023 04:33)  T(F): 98.1 (25 Aug 2023 12:19), Max: 98.2 (25 Aug 2023 04:33)  HR: 65 (25 Aug 2023 12:19) (57 - 65)  BP: 132/85 (25 Aug 2023 12:19) (111/77 - 139/89)  BP(mean): --  RR: 16 (25 Aug 2023 12:19) (16 - 18)  SpO2: 98% (25 Aug 2023 12:19) (95% - 100%)    Parameters below as of 25 Aug 2023 12:19  Patient On (Oxygen Delivery Method): room air        I&O's Summary    24 Aug 2023 07:01  -  25 Aug 2023 07:00  --------------------------------------------------------  IN: 1452 mL / OUT: 1200 mL / NET: 252 mL    25 Aug 2023 07:01  -  25 Aug 2023 14:55  --------------------------------------------------------  IN: 1005 mL / OUT: 0 mL / NET: 1005 mL        CAPILLARY BLOOD GLUCOSE      POCT Blood Glucose.: 103 mg/dL (25 Aug 2023 12:48)  POCT Blood Glucose.: 82 mg/dL (25 Aug 2023 08:14)  POCT Blood Glucose.: 82 mg/dL (25 Aug 2023 04:32)  POCT Blood Glucose.: 91 mg/dL (25 Aug 2023 00:38)  POCT Blood Glucose.: 116 mg/dL (24 Aug 2023 21:05)  POCT Blood Glucose.: 137 mg/dL (24 Aug 2023 17:27)      PHYSICAL EXAM:    Constitutional: NAD, awake and alert, well-developed  HEENT: PERR, EOMI, Normal Hearing, MMM  Neck: Soft and supple, No LAD, No JVD  Respiratory: Breath sounds are clear bilaterally, No wheezing, rales or rhonchi  Cardiovascular: S1 and S2, regular rate and rhythm, no Murmurs, gallops or rubs  Gastrointestinal: Bowel Sounds present, soft, nontender, nondistended, no guarding, no rebound  Extremities: No peripheral edema  Vascular: 2+ peripheral pulses  Neurological: A/O x 3, no focal deficits  Musculoskeletal: 5/5 strength b/l upper and lower extremities  Skin: No rashes    MEDICATIONS:  MEDICATIONS  (STANDING):  clopidogrel Tablet 75 milliGRAM(s) Oral daily  famotidine    Tablet 20 milliGRAM(s) Oral two times a day  heparin   Injectable 5000 Unit(s) SubCutaneous every 12 hours  lactated ringers. 1000 milliLiter(s) (75 mL/Hr) IV Continuous <Continuous>  QUEtiapine 25 milliGRAM(s) Oral <User Schedule>  QUEtiapine 100 milliGRAM(s) Oral at bedtime  tamsulosin 0.4 milliGRAM(s) Oral at bedtime  traZODone 50 milliGRAM(s) Oral at bedtime      LABS: All Labs Reviewed:                        14.8   3.97  )-----------( 168      ( 25 Aug 2023 04:44 )             43.2     08-25    138  |  105  |  15.5  ----------------------------<  77  4.0   |  23.0  |  0.96    Ca    9.0      25 Aug 2023 04:44  Phos  3.2     08-24  Mg     1.9     08-24    TPro  5.6<L>  /  Alb  3.2<L>  /  TBili  0.6  /  DBili  0.2  /  AST  19  /  ALT  18  /  AlkPhos  53  08-25          Blood Culture:     RADIOLOGY/EKG:    DVT PPX:    ADVANCED DIRECTIVE:    DISPOSITION: HPI  [place] 64[place] [facility] [place] and dilated pancreatic duct  Pt was seen and examined at bedside. Denies of any HA. Pt has no abd pain. States has anxiety for MRI     Vital Signs Last 24 Hrs  T(C): 36.7 (25 Aug 2023 12:19), Max: 36.8 (25 Aug 2023 04:33)  T(F): 98.1 (25 Aug 2023 12:19), Max: 98.2 (25 Aug 2023 04:33)  HR: 65 (25 Aug 2023 12:19) (57 - 65)  BP: 132/85 (25 Aug 2023 12:19) (111/77 - 139/89)  BP(mean): --  RR: 16 (25 Aug 2023 12:19) (16 - 18)  SpO2: 98% (25 Aug 2023 12:19) (95% - 100%)    Parameters below as of 25 Aug 2023 12:19  Patient On (Oxygen Delivery Method): room air        I&O's Summary    24 Aug 2023 07:01  -  25 Aug 2023 07:00  --------------------------------------------------------  IN: 1452 mL / OUT: 1200 mL / NET: 252 mL    25 Aug 2023 07:01  -  25 Aug 2023 14:55  --------------------------------------------------------  IN: 1005 mL / OUT: 0 mL / NET: 1005 mL        CAPILLARY BLOOD GLUCOSE      POCT Blood Glucose.: 103 mg/dL (25 Aug 2023 12:48)  POCT Blood Glucose.: 82 mg/dL (25 Aug 2023 08:14)  POCT Blood Glucose.: 82 mg/dL (25 Aug 2023 04:32)  POCT Blood Glucose.: 91 mg/dL (25 Aug 2023 00:38)  POCT Blood Glucose.: 116 mg/dL (24 Aug 2023 21:05)  POCT Blood Glucose.: 137 mg/dL (24 Aug 2023 17:27)      PHYSICAL EXAM:    Constitutional: NAD, awake and alert, well-developed  HEENT: PERR, EOMI, Normal Hearing, MMM  Neck: Soft and supple, No LAD, No JVD  Respiratory: Breath sounds are clear bilaterally, No wheezing, rales or rhonchi  Cardiovascular: S1 and S2, regular rate and rhythm, no Murmurs, gallops or rubs  Gastrointestinal: Bowel Sounds present, soft, nontender, nondistended, no guarding, no rebound  Extremities: No peripheral edema  Vascular: 2+ peripheral pulses  Neurological: A/O x 3, no focal deficits  Musculoskeletal: 5/5 strength b/l upper and lower extremities  Skin: No rashes    MEDICATIONS:  MEDICATIONS  (STANDING):  clopidogrel Tablet 75 milliGRAM(s) Oral daily  famotidine    Tablet 20 milliGRAM(s) Oral two times a day  heparin   Injectable 5000 Unit(s) SubCutaneous every 12 hours  lactated ringers. 1000 milliLiter(s) (75 mL/Hr) IV Continuous <Continuous>  QUEtiapine 25 milliGRAM(s) Oral <User Schedule>  QUEtiapine 100 milliGRAM(s) Oral at bedtime  tamsulosin 0.4 milliGRAM(s) Oral at bedtime  traZODone 50 milliGRAM(s) Oral at bedtime      LABS: All Labs Reviewed:                        14.8   3.97  )-----------( 168      ( 25 Aug 2023 04:44 )             43.2     08-25    138  |  105  |  15.5  ----------------------------<  77  4.0   |  23.0  |  0.96    Ca    9.0      25 Aug 2023 04:44  Phos  3.2     08-24  Mg     1.9     08-24    TPro  5.6<L>  /  Alb  3.2<L>  /  TBili  0.6  /  DBili  0.2  /  AST  19  /  ALT  18  /  AlkPhos  53  08-25          Blood Culture:     RADIOLOGY/EKG:    DVT PPX:    ADVANCED DIRECTIVE:    DISPOSITION: HPI  Pt is a 62yo M transfer from [facility] [place] for right ICA disection and dilated pancreatic duct  Pt was seen and examined at bedside. Denies of any HA. Pt has no abd pain. States has anxiety for MRI     Vital Signs Last 24 Hrs  T(C): 36.7 (25 Aug 2023 12:19), Max: 36.8 (25 Aug 2023 04:33)  T(F): 98.1 (25 Aug 2023 12:19), Max: 98.2 (25 Aug 2023 04:33)  HR: 65 (25 Aug 2023 12:19) (57 - 65)  BP: 132/85 (25 Aug 2023 12:19) (111/77 - 139/89)  BP(mean): --  RR: 16 (25 Aug 2023 12:19) (16 - 18)  SpO2: 98% (25 Aug 2023 12:19) (95% - 100%)    Parameters below as of 25 Aug 2023 12:19  Patient On (Oxygen Delivery Method): room air        I&O's Summary    24 Aug 2023 07:01  -  25 Aug 2023 07:00  --------------------------------------------------------  IN: 1452 mL / OUT: 1200 mL / NET: 252 mL    25 Aug 2023 07:01  -  25 Aug 2023 14:55  --------------------------------------------------------  IN: 1005 mL / OUT: 0 mL / NET: 1005 mL        CAPILLARY BLOOD GLUCOSE      POCT Blood Glucose.: 103 mg/dL (25 Aug 2023 12:48)  POCT Blood Glucose.: 82 mg/dL (25 Aug 2023 08:14)  POCT Blood Glucose.: 82 mg/dL (25 Aug 2023 04:32)  POCT Blood Glucose.: 91 mg/dL (25 Aug 2023 00:38)  POCT Blood Glucose.: 116 mg/dL (24 Aug 2023 21:05)  POCT Blood Glucose.: 137 mg/dL (24 Aug 2023 17:27)      PHYSICAL EXAM:    Constitutional: NAD, awake and alert, well-developed  HEENT: PERR, EOMI, Normal Hearing, MMM  Neck: Soft and supple, No LAD, No JVD  Respiratory: Breath sounds are clear bilaterally, No wheezing, rales or rhonchi  Cardiovascular: S1 and S2, regular rate and rhythm, no Murmurs, gallops or rubs  Gastrointestinal: Bowel Sounds present, soft, nontender, nondistended, no guarding, no rebound  Extremities: No peripheral edema  Vascular: 2+ peripheral pulses  Neurological: A/O x 3, no focal deficits  Musculoskeletal: 5/5 strength b/l upper and lower extremities  Skin: No rashes    MEDICATIONS:  MEDICATIONS  (STANDING):  clopidogrel Tablet 75 milliGRAM(s) Oral daily  famotidine    Tablet 20 milliGRAM(s) Oral two times a day  heparin   Injectable 5000 Unit(s) SubCutaneous every 12 hours  lactated ringers. 1000 milliLiter(s) (75 mL/Hr) IV Continuous <Continuous>  QUEtiapine 25 milliGRAM(s) Oral <User Schedule>  QUEtiapine 100 milliGRAM(s) Oral at bedtime  tamsulosin 0.4 milliGRAM(s) Oral at bedtime  traZODone 50 milliGRAM(s) Oral at bedtime      LABS: All Labs Reviewed:                        14.8   3.97  )-----------( 168      ( 25 Aug 2023 04:44 )             43.2     08-25    138  |  105  |  15.5  ----------------------------<  77  4.0   |  23.0  |  0.96    Ca    9.0      25 Aug 2023 04:44  Phos  3.2     08-24  Mg     1.9     08-24    TPro  5.6<L>  /  Alb  3.2<L>  /  TBili  0.6  /  DBili  0.2  /  AST  19  /  ALT  18  /  AlkPhos  53  08-25          Blood Culture:     RADIOLOGY/EKG:    DVT PPX:    ADVANCED DIRECTIVE:    DISPOSITION:

## 2023-08-25 NOTE — PROGRESS NOTE ADULT - ASSESSMENT
INCOMPLETE    ASSESSMENT: 63M with PMHX MDD, Anxiety, ?Bipolar, BPH, Tobacco/THC Abuse, ETOH Abuse s/p [facility] x 10 days presented to [facility] [place] c/o HA (x 1-2 weeks) and noted Blurry Vision, persistent sent in from rehab.  MRI without evidence of acute infarction, there are non specific flair foci as noted which should be further evaluated and followed.  MR angiogram demonstrated right ICA dissection and right MCA stenosis, fetal left PCA. [place] [facility] [place] [facility] for  further evaluation.  Etiology of dissection unclear at this time, patient does not recall any trauma or falls. Does report when he sleeps he sleeps with his neck bent for extended periods of time. Can not exclude connective tissue disorder.     NEURO:   -Neurologically without acute change, reports chronic right eye ptosis, notes cataracts being followed, headache improved  -Continue close monitoring for neurologic deterioration    - Stroke neuro checks q 4 hrs, please notify on call stroke attending for any neurological changes, decline, or new/worsening symptoms immediately, patient counselled on calling 911 once discharged for stroke symptoms, BEFAST.   -  SBP goal 110-130mmHg being tolerated at this time, avoid rapid fluctuations and further hypotension   - TCD pending to further screen for HITS/embolic events  in territory of dissection as that could raise concern for embolic events   - Lipid panel, A1C   -ANTITHROMBOTIC THERAPY: hx of etoh abuse, unclear if gastritis now with GI following for noted pancreatic lesion, if not contraindicated from medical/gi standpoint single antiplatelet with Clopidogrel 75mg daily and GI ppx.   -titrate statin to LDL goal less than 70 (if can tolerate hepatically)  -MRI imaging as noted   - Further interventional radiology follow up pending findings of noted workup and clinical course.   -Dysphagia screen: pass   -TTE   -Physical therapy/OT/Speech eval/treatment.     -CARDIOVASCULAR: check TTE, cardiac monitoring w/ telemetry for now, further evaluation pending findings of noted workup                              -HEMATOLOGY: H/H without anemia, Platelets 168, patient should have all age and risk appropriate malignancy screenings with PCP or sooner if clinically suspected , GI following      DVT ppx: no acute neurological contraindication to pharmacological dvt ppx     GI: Prominence of the pancreas with mild dilatation of the main pancreatic duct. Follow-up MRI/MRCP is recommended by GI team       PULMONARY:  protecting airway, saturating well. There is a 1.1 cm nodular opacity at the right lung base. Would recommend chest CT to further differentiate.     RENAL: BUN/Cr within range, monitor urine output, maintain adequate hydration       Na Goal:  135-145    ID: afebrile, no leukocytosis, monitor for si/sx of infection     OTHER:  condition and plan of care d/w patient, questions and concerns addressed. Counselling and support provided, etoh/nicotine cessation / education. Social work followup. Verbalizes/expresses full understanding.     DISPOSITION: Rehab or home depending on PT eval once stable and workup is complete      CORE MEASURES:        Admission NIHSS: 1      Tenecteplase : [] YES [x] NO      LDL/HDL/A1C: pending      Depression Screen- if depression hx and/or present      Statin Therapy: as noted      Dysphagia Screen: [x] PASS [] FAIL     Smoking [x] YES [] NO      Afib [] YES [x] NO     Stroke Education [x] YES [] NO    Obtain screening lower extremity venous ultrasound in patients who meet 1 or more of the following criteria as patient is high risk for DVT/PE on admission:   [] History of DVT/PE  [x]Hypercoagulable states (Factor V Leiden, Cancer, OCP, etc. )  [x]Prolonged immobility (hemiplegia/hemiparesis/post operative or any other extended immobilization)  [] Transferred from outside facility (Rehab or Long term care)  [] Age </= to 50       INCOMPLETE    ASSESSMENT: 63M with PMHX MDD, Anxiety, ?Bipolar, BPH, Tobacco/THC Abuse, ETOH Abuse s/p [facility] x 10 days [place] [facility] [place]/o HA (x 1-2 weeks) and noted Blurry Vision, persistent sent in from rehab.  MRI without evidence of acute infarction, there are non specific flair foci as noted which should be further evaluated and followed.  MR angiogram demonstrated right ICA dissection and right MCA stenosis, fetal left PCA. Patient was loaded with [place]SA[place]av[place] at [facility] [place] and transferred to [facility] for  further evaluation.  Etiology of dissection unclear at this time, patient does not recall any trauma or falls. Does report when he sleeps he sleeps with his neck bent for extended periods of time. Can not exclude connective tissue disorder.     NEURO:   -Neurologically without acute change, reports chronic right eye ptosis, notes cataracts being followed, headache improved  -Continue close monitoring for neurologic deterioration    - Stroke neuro checks q 4 hrs, please notify on call stroke attending for any neurological changes, decline, or new/worsening symptoms immediately, patient counselled on calling 911 once discharged for stroke symptoms, BEFAST.   -  SBP goal 110-130mmHg being tolerated at this time, avoid rapid fluctuations and further hypotension   - TCD pending to further screen for HITS/embolic events  in territory of dissection as that could raise concern for embolic events   - Lipid panel, A1C   -ANTITHROMBOTIC THERAPY: hx of etoh abuse, unclear if gastritis now with GI following for noted pancreatic lesion, if not contraindicated from medical/gi standpoint single antiplatelet with Clopidogrel 75mg daily and GI ppx.   -titrate statin to LDL goal less than 70 (if can tolerate hepatically)  -MRI imaging as noted   - Further interventional radiology follow up pending findings of noted workup and clinical course.   -Dysphagia screen: pass   -TTE   -Physical therapy/OT/Speech eval/treatment.     -CARDIOVASCULAR: check TTE, cardiac monitoring w/ telemetry for now, further evaluation pending findings of noted workup                              -HEMATOLOGY: H/H without anemia, Platelets 168, patient should have all age and risk appropriate malignancy screenings with PCP or sooner if clinically suspected , GI following      DVT ppx: no acute neurological contraindication to pharmacological dvt ppx     GI: Prominence of the pancreas with mild dilatation of the main pancreatic duct. Follow-up MRI/MRCP is recommended by GI team       PULMONARY:  protecting airway, saturating well. There is a 1.1 cm nodular opacity at the right lung base. Would recommend chest CT to further differentiate.     RENAL: BUN/Cr within range, monitor urine output, maintain adequate hydration       Na Goal:  135-145    ID: afebrile, no leukocytosis, monitor for si/sx of infection     OTHER:  condition and plan of care d/w patient, questions and concerns addressed. Counselling and support provided, etoh/nicotine cessation / education. Social work followup. Verbalizes/expresses full understanding.     DISPOSITION: Rehab or home depending on PT eval once stable and workup is complete      CORE MEASURES:        Admission NIHSS: 1      Tenecteplase : [] YES [x] NO      LDL/HDL/A1C: pending      Depression Screen- if depression hx and/or present      Statin Therapy: as noted      Dysphagia Screen: [x] PASS [] FAIL     Smoking [x] YES [] NO      Afib [] YES [x] NO     Stroke Education [x] YES [] NO    Obtain screening lower extremity venous ultrasound in patients who meet 1 or more of the following criteria as patient is high risk for DVT/PE on admission:   [] History of DVT/PE  [x]Hypercoagulable states (Factor V Leiden, Cancer, OCP, etc. )  [x]Prolonged immobility (hemiplegia/hemiparesis/post operative or any other extended immobilization)  [] Transferred from outside facility (Rehab or Long term care)  [] Age </= to 50       INCOMPLETE    ASSESSMENT: 63M with PMHX MDD, Anxiety, ?Bipolar, BPH, Tobacco/THC Abuse, ETOH Abuse s/p [facility] x 10 days [place] [facility] [place] (x 1-2 weeks) and noted Blurry Vision, persistent sent in from rehab.  MRI without evidence of acute infarction, there are non specific flair foci as noted which should be further evaluated and followed.  MR angiogram demonstrated right ICA dissection and right MCA stenosis, fetal left PCA. [place] [facility] [place] [facility] for  further evaluation.  Etiology of dissection unclear at this time, patient does not recall any trauma or falls. Does report when he sleeps he sleeps with his neck bent for extended periods of time. Can not exclude connective tissue disorder.     NEURO:   -Neurologically without acute change, reports chronic right eye ptosis, notes cataracts being followed, headache improved  -Continue close monitoring for neurologic deterioration    - Stroke neuro checks q 4 hrs, please notify on call stroke attending for any neurological changes, decline, or new/worsening symptoms immediately, patient counselled on calling 911 once discharged for stroke symptoms, BEFAST.   -  SBP goal 110-130mmHg being tolerated at this time, avoid rapid fluctuations and further hypotension   - TCD pending to further screen for HITS/embolic events  in territory of dissection as that could raise concern for embolic events   - Lipid panel, A1C   -ANTITHROMBOTIC THERAPY: hx of etoh abuse, unclear if gastritis now with GI following for noted pancreatic lesion, if not contraindicated from medical/gi standpoint single antiplatelet with Clopidogrel 75mg daily and GI ppx.   -titrate statin to LDL goal less than 70 (if can tolerate hepatically)  -MRI imaging as noted   - Further interventional radiology follow up pending findings of noted workup and clinical course.   -Dysphagia screen: pass   -TTE   -Physical therapy/OT/Speech eval/treatment.     -CARDIOVASCULAR: check TTE, cardiac monitoring w/ telemetry for now, further evaluation pending findings of noted workup                              -HEMATOLOGY: H/H without anemia, Platelets 168, patient should have all age and risk appropriate malignancy screenings with PCP or sooner if clinically suspected , GI following      DVT ppx: no acute neurological contraindication to pharmacological dvt ppx     GI: Prominence of the pancreas with mild dilatation of the main pancreatic duct. Follow-up MRI/MRCP is recommended by GI team       PULMONARY:  protecting airway, saturating well. There is a 1.1 cm nodular opacity at the right lung base. Would recommend chest CT to further differentiate.     RENAL: BUN/Cr within range, monitor urine output, maintain adequate hydration       Na Goal:  135-145    ID: afebrile, no leukocytosis, monitor for si/sx of infection     OTHER:  condition and plan of care d/w patient, questions and concerns addressed. Counselling and support provided, etoh/nicotine cessation / education. Social work followup. Verbalizes/expresses full understanding.     DISPOSITION: Rehab or home depending on PT eval once stable and workup is complete      CORE MEASURES:        Admission NIHSS: 1      Tenecteplase : [] YES [x] NO      LDL/HDL/A1C: pending      Depression Screen- if depression hx and/or present      Statin Therapy: as noted      Dysphagia Screen: [x] PASS [] FAIL     Smoking [x] YES [] NO      Afib [] YES [x] NO     Stroke Education [x] YES [] NO    Obtain screening lower extremity venous ultrasound in patients who meet 1 or more of the following criteria as patient is high risk for DVT/PE on admission:   [] History of DVT/PE  [x]Hypercoagulable states (Factor V Leiden, Cancer, OCP, etc. )  [x]Prolonged immobility (hemiplegia/hemiparesis/post operative or any other extended immobilization)  [] Transferred from outside facility (Rehab or Long term care)  [] Age </= to 50   ASSESSMENT: 63M with PMHX MDD, Anxiety, ?Bipolar, BPH, Tobacco/THC Abuse, ETOH Abuse s/p [facility] x 10 days [place] [facility] [place]/o HA (x 1-2 weeks) and noted Blurry Vision, persistent sent in from rehab.  MRI without evidence of acute infarction, there are non specific flair foci as noted which should be further evaluated and followed.  MR angiogram demonstrated right ICA dissection and right MCA stenosis, fetal left PCA. Patient was loaded with ASA/Plavix at [facility] [place] and transferred to [facility] for  further evaluation.  Etiology of dissection unclear at this time, patient does not recall any trauma or falls but said " have not been in fight for a while".  Does report when he sleeps he sleeps with his neck bent for extended periods of time. Can not exclude connective tissue disorder vs trauma in settings of hx of ETOH.     NEURO:   -Neurologically without acute change, reports chronic right eye ptosis, notes cataracts being followed, headache resolved   -Continue close monitoring for neurologic deterioration    - Stroke neuro checks q 4 hrs, please notify on call stroke attending for any neurological changes, decline, or new/worsening symptoms immediately, patient counselled on calling 911 once discharged for stroke symptoms, BEFAST.   -  SBP goal 110-130mmHg being tolerated at this time, avoid rapid fluctuations and further hypotension   - TCD, w/o HITS (embolic events  in territory of dissection as that could raise concern for embolic events)  - A1C   -ANTITHROMBOTIC THERAPY: hx of etoh abuse, unclear if gastritis now with GI following for noted pancreatic lesion, if not contraindicated from medical/gi standpoint single antiplatelet with Clopidogrel 75mg daily and GI ppx.   -titrate statin to LDL goal less than 70 (if can tolerate hepatically)  -MRI imaging as noted   - Further interventional radiology follow up pending findings of noted workup and clinical course  -Dysphagia screen: pass   -TTE (pending)  -might consider hem/onc. consult pending GI and CT chest work up   -Physical therapy/OT/Speech eval/treatment.     -CARDIOVASCULAR: check TTE, cardiac monitoring w/ telemetry for now, further evaluation pending findings of noted workup                              -HEMATOLOGY: H/H without anemia, Platelets 168, patient should have all age and risk appropriate malignancy screenings with PCP or sooner if clinically suspected      DVT ppx: no acute neurological contraindication to pharmacological dvt ppx     GI: Prominence of the pancreas with mild dilatation of the main pancreatic duct. Follow-up MRI/MRCP is recommended by GI team 8/25/23       PULMONARY:  protecting airway, saturating well. There is a 1.1 cm nodular opacity at the right lung base. Would recommend chest CT to further differentiate.     RENAL: BUN/Cr within range, monitor urine output, maintain adequate hydration       Na Goal:  135-145    ID: afebrile, no leukocytosis, monitor for si/sx of infection     OTHER:  condition and plan of care d/w patient, questions and concerns addressed. Counselling and support provided, etoh/nicotine cessation / education. Social work followup. Verbalizes/expresses full understanding.     DISPOSITION: Rehab or home depending on PT eval once stable and workup is complete      CORE MEASURES:        Admission NIHSS: 1      Tenecteplase : [] YES [x] NO      LDL/HDL/A1C: 65/66/ pending     Depression Screen- if depression hx and/or present      Statin Therapy: as noted      Dysphagia Screen: [x] PASS [] FAIL     Smoking [x] YES [] NO      Afib [] YES [x] NO     Stroke Education [x] YES [] NO    Obtain screening lower extremity venous ultrasound in patients who meet 1 or more of the following criteria as patient is high risk for DVT/PE on admission:   [] History of DVT/PE  [x]Hypercoagulable states (Factor V Leiden, Cancer, OCP, etc. )  [x]Prolonged immobility (hemiplegia/hemiparesis/post operative or any other extended immobilization)  [] Transferred from outside facility (Rehab or Long term care)  [] Age </= to 50   ASSESSMENT: 63M with PMHX MDD, Anxiety, ?Bipolar, BPH, Tobacco/THC Abuse, ETOH Abuse s/p [facility] x 10 days presented to [facility] [place] c/o HA (x 1-2 weeks) and noted Blurry Vision, persistent sent in from rehab.  MRI without evidence of acute infarction, there are non specific flair foci as noted which should be further evaluated and followed.  MR angiogram demonstrated right ICA dissection and right MCA stenosis, fetal left PCA. Patient was [place] [facility] [place] and [place] to [facility] for  further evaluation.  Etiology of dissection unclear at this time, patient does not recall any trauma or falls but said " have not been in fight for a while".  Does report when he sleeps he sleeps with his neck bent for extended periods of time. Can not exclude connective tissue disorder vs trauma in settings of hx of ETOH.     NEURO:   -Neurologically without acute change, reports chronic right eye ptosis, notes cataracts being followed, headache resolved   -Continue close monitoring for neurologic deterioration    - Stroke neuro checks q 4 hrs, please notify on call stroke attending for any neurological changes, decline, or new/worsening symptoms immediately, patient counselled on calling 911 once discharged for stroke symptoms, BEFAST.   -  SBP goal 110-130mmHg being tolerated at this time, avoid rapid fluctuations and further hypotension   - TCD, w/o HITS (embolic events  in territory of dissection as that could raise concern for embolic events)  - A1C   -ANTITHROMBOTIC THERAPY: hx of etoh abuse, unclear if gastritis now with GI following for noted pancreatic lesion, if not contraindicated from medical/gi standpoint single antiplatelet with Clopidogrel 75mg daily and GI ppx.   -titrate statin to LDL goal less than 70 (if can tolerate hepatically)  -MRI imaging as noted   - Further interventional radiology follow up pending findings of noted workup and clinical course  -Dysphagia screen: pass   -TTE (pending)  -might consider hem/onc. consult pending GI and CT chest work up   -Physical therapy/OT/Speech eval/treatment.     -CARDIOVASCULAR: check TTE, cardiac monitoring w/ telemetry for now, further evaluation pending findings of noted workup                              -HEMATOLOGY: H/H without anemia, Platelets 168, patient should have all age and risk appropriate malignancy screenings with PCP or sooner if clinically suspected      DVT ppx: no acute neurological contraindication to pharmacological dvt ppx     GI: Prominence of the pancreas with mild dilatation of the main pancreatic duct. Follow-up MRI/MRCP is recommended by GI team 8/25/23       PULMONARY:  protecting airway, saturating well. There is a 1.1 cm nodular opacity at the right lung base. Would recommend chest CT to further differentiate.     RENAL: BUN/Cr within range, monitor urine output, maintain adequate hydration       Na Goal:  135-145    ID: afebrile, no leukocytosis, monitor for si/sx of infection     OTHER:  condition and plan of care d/w patient, questions and concerns addressed. Counselling and support provided, etoh/nicotine cessation / education. Social work followup. Verbalizes/expresses full understanding.     DISPOSITION: Rehab or home depending on PT eval once stable and workup is complete      CORE MEASURES:        Admission NIHSS: 1      Tenecteplase : [] YES [x] NO      LDL/HDL/A1C: 65/66/ pending     Depression Screen- if depression hx and/or present      Statin Therapy: as noted      Dysphagia Screen: [x] PASS [] FAIL     Smoking [x] YES [] NO      Afib [] YES [x] NO     Stroke Education [x] YES [] NO    Obtain screening lower extremity venous ultrasound in patients who meet 1 or more of the following criteria as patient is high risk for DVT/PE on admission:   [] History of DVT/PE  [x]Hypercoagulable states (Factor V Leiden, Cancer, OCP, etc. )  [x]Prolonged immobility (hemiplegia/hemiparesis/post operative or any other extended immobilization)  [] Transferred from outside facility (Rehab or Long term care)  [] Age </= to 50   ASSESSMENT: 63M with PMHX MDD, Anxiety, ?Bipolar, BPH, Tobacco/THC Abuse, ETOH Abuse s/p [facility] x 10 days presented [place] [facility] [place]/o HA (x 1-2 weeks) and noted Blurry Vision, persistent sent in from rehab.  MRI without evidence of acute infarction, there are non specific flair foci as noted which should be further evaluated and followed.  MR angiogram demonstrated right ICA dissection and right MCA stenosis, fetal left PCA. [place] [facility] [place] [facility] for  further evaluation.  Etiology of dissection unclear at this time, patient does not recall any trauma or falls but said " have not been in fight for a while".  Does report when he sleeps he sleeps with his neck bent for extended periods of time. Can not exclude connective tissue disorder vs trauma in settings of hx of ETOH.     NEURO:   -Neurologically without acute change, reports chronic right eye ptosis, notes cataracts being followed, headache resolved   -Continue close monitoring for neurologic deterioration    - Stroke neuro checks q 4 hrs, please notify on call stroke attending for any neurological changes, decline, or new/worsening symptoms immediately, patient counselled on calling 911 once discharged for stroke symptoms, BEFAST.   -  SBP goal 110-130mmHg being tolerated at this time, avoid rapid fluctuations and further hypotension   - TCD, w/o HITS (embolic events  in territory of dissection as that could raise concern for embolic events)  - A1C   -ANTITHROMBOTIC THERAPY: hx of etoh abuse, unclear if gastritis now with GI following for noted pancreatic lesion, if not contraindicated from medical/gi standpoint single antiplatelet with Clopidogrel 75mg daily and GI ppx.   -titrate statin to LDL goal less than 70 (if can tolerate hepatically)  -MRI imaging as noted   - Further interventional radiology follow up pending findings of noted workup and clinical course  -Dysphagia screen: pass   -TTE (pending)  -might consider hem/onc. consult pending GI and CT chest work up   -Physical therapy/OT/Speech eval/treatment.     -CARDIOVASCULAR: check TTE, cardiac monitoring w/ telemetry for now, further evaluation pending findings of noted workup                              -HEMATOLOGY: H/H without anemia, Platelets 168, patient should have all age and risk appropriate malignancy screenings with PCP or sooner if clinically suspected      DVT ppx: no acute neurological contraindication to pharmacological dvt ppx     GI: Prominence of the pancreas with mild dilatation of the main pancreatic duct. Follow-up MRI/MRCP is recommended by GI team 8/25/23       PULMONARY:  protecting airway, saturating well. There is a 1.1 cm nodular opacity at the right lung base. Would recommend chest CT to further differentiate.     RENAL: BUN/Cr within range, monitor urine output, maintain adequate hydration       Na Goal:  135-145    ID: afebrile, no leukocytosis, monitor for si/sx of infection     OTHER:  condition and plan of care d/w patient, questions and concerns addressed. Counselling and support provided, etoh/nicotine cessation / education. Social work followup. Verbalizes/expresses full understanding.     DISPOSITION: Rehab or home depending on PT eval once stable and workup is complete      CORE MEASURES:        Admission NIHSS: 1      Tenecteplase : [] YES [x] NO      LDL/HDL/A1C: 65/66/ pending     Depression Screen- if depression hx and/or present      Statin Therapy: as noted      Dysphagia Screen: [x] PASS [] FAIL     Smoking [x] YES [] NO      Afib [] YES [x] NO     Stroke Education [x] YES [] NO    Obtain screening lower extremity venous ultrasound in patients who meet 1 or more of the following criteria as patient is high risk for DVT/PE on admission:   [] History of DVT/PE  [x]Hypercoagulable states (Factor V Leiden, Cancer, OCP, etc. )  [x]Prolonged immobility (hemiplegia/hemiparesis/post operative or any other extended immobilization)  [] Transferred from outside facility (Rehab or Long term care)  [] Age </= to 50   ASSESSMENT: 63M with PMHX MDD, Anxiety, ?Bipolar, BPH, Tobacco/THC Abuse, ETOH Abuse s/p [facility] x 10 days presented to [facility] [place]/o HA (x 1-2 weeks) and noted Blurry Vision, persistent sent in from rehab.  MRI without evidence of acute infarction, there are non specific flair foci as noted which should be further evaluated and followed.  MR angiogram demonstrated right ICA dissection and right MCA stenosis, fetal left PCA. Patient was loaded with ASA/Plavix at [facility] [place] and transferred to [facility] for  further evaluation.  Etiology of dissection unclear at this time, patient does not recall any trauma or falls but said " have not been in fight for a while".  Does report when he sleeps he sleeps with his neck bent for extended periods of time. Can not exclude connective tissue disorder vs trauma in settings of hx of ETOH.     NEURO:   -Neurologically without acute change, reports chronic right eye ptosis, notes cataracts being followed, headache resolved   -Continue close monitoring for neurologic deterioration    - Stroke neuro checks q 4 hrs  -  SBP goal 110-130mmHg being tolerated at this time, avoid rapid fluctuations and further hypotension   - TCD, w/o HITS (embolic events  in territory of dissection as that could raise concern for embolic events)  - A1C   -ANTITHROMBOTIC THERAPY: hx of etoh abuse, unclear if gastritis now with GI following for noted pancreatic lesion, if not contraindicated from medical/gi standpoint single antiplatelet with Clopidogrel 75mg daily and GI ppx.   -titrate statin to LDL goal less than 70 (if can tolerate hepatically)  -MRI imaging as noted   - Further interventional radiology follow up pending findings of noted workup and clinical course  -Dysphagia screen: pass   -TTE (pending)  -might consider hem/onc. consult pending GI and CT chest work up   -Physical therapy/OT/Speech eval/treatment.     -CARDIOVASCULAR: check TTE, cardiac monitoring w/ telemetry for now, further evaluation pending findings of noted workup                              -HEMATOLOGY: H/H without anemia, Platelets 168, patient should have all age and risk appropriate malignancy screenings with PCP or sooner if clinically suspected      DVT ppx: no acute neurological contraindication to pharmacological dvt ppx     GI: Prominence of the pancreas with mild dilatation of the main pancreatic duct. Follow-up MRI/MRCP is recommended by GI team 8/25/23       PULMONARY:  protecting airway, saturating well. There is a 1.1 cm nodular opacity at the right lung base. Would recommend chest CT to further differentiate.     RENAL: BUN/Cr within range, monitor urine output, maintain adequate hydration       Na Goal:  135-145    ID: afebrile, no leukocytosis, monitor for si/sx of infection     OTHER:  condition and plan of care d/w patient, questions and concerns addressed. Counselling and support provided, etoh/nicotine cessation / education. Social work followup. Verbalizes/expresses full understanding.     DISPOSITION: Rehab or home depending on PT eval once stable and workup is complete      CORE MEASURES:        Admission NIHSS: 1      Tenecteplase : [] YES [x] NO      LDL/HDL/A1C: 65/66/ pending     Depression Screen- if depression hx and/or present      Statin Therapy: as noted      Dysphagia Screen: [x] PASS [] FAIL     Smoking [x] YES [] NO      Afib [] YES [x] NO     Stroke Education [x] YES [] NO    Obtain screening lower extremity venous ultrasound in patients who meet 1 or more of the following criteria as patient is high risk for DVT/PE on admission:   [] History of DVT/PE  [x]Hypercoagulable states (Factor V Leiden, Cancer, OCP, etc. )  [x]Prolonged immobility (hemiplegia/hemiparesis/post operative or any other extended immobilization)  [] Transferred from outside facility (Rehab or Long term care)  [] Age </= to 50   ASSESSMENT: 63M with PMHX MDD, Anxiety, ?Bipolar, BPH, Tobacco/THC Abuse, ETOH Abuse s/p [facility] x 10 days presented to [facility] [place] [place] c/o HA (x 1-2 weeks) and noted Blurry Vision, persistent sent in from rehab.  MRI without evidence of acute infarction, there are non specific flair foci as noted which should be further evaluated and followed.  MR angiogram demonstrated right ICA dissection and right MCA stenosis, fetal left PCA. Patient was loaded with [place]SA/Plavix at [facility] [place] and transferred to [facility] for  further evaluation.  Etiology of dissection unclear at this time, patient does not recall any trauma or falls but said " have not been in fight for a while".  Does report when he sleeps he sleeps with his neck bent for extended periods of time. Can not exclude connective tissue disorder vs trauma in settings of hx of ETOH.     NEURO:   -Neurologically without acute change, reports chronic right eye ptosis, notes cataracts being followed, headache resolved   -Continue close monitoring for neurologic deterioration    - Stroke neuro checks q 4 hrs  -  SBP goal 110-130mmHg being tolerated at this time, avoid rapid fluctuations and further hypotension   - TCD, w/o HITS (embolic events  in territory of dissection as that could raise concern for embolic events)  - A1C   -ANTITHROMBOTIC THERAPY: hx of etoh abuse, unclear if gastritis now with GI following for noted pancreatic lesion, if not contraindicated from medical/gi standpoint single antiplatelet with Clopidogrel 75mg daily and GI ppx.   -titrate statin to LDL goal less than 70 (if can tolerate hepatically)  -MRI imaging as noted   - Further interventional radiology follow up pending findings of noted workup and clinical course  -Dysphagia screen: pass   -TTE (pending)  -might consider hem/onc. consult pending GI and CT chest work up   -Physical therapy/OT/Speech eval/treatment.     -CARDIOVASCULAR: check TTE, cardiac monitoring w/ telemetry for now, further evaluation pending findings of noted workup                              -HEMATOLOGY: H/H without anemia, Platelets 168, patient should have all age and risk appropriate malignancy screenings with PCP or sooner if clinically suspected      DVT ppx: no acute neurological contraindication to pharmacological dvt ppx     GI: Prominence of the pancreas with mild dilatation of the main pancreatic duct. Follow-up MRI/MRCP is recommended by GI team 8/25/23       PULMONARY:  protecting airway, saturating well. There is a 1.1 cm nodular opacity at the right lung base. Would recommend chest CT to further differentiate.     RENAL: BUN/Cr within range, monitor urine output, maintain adequate hydration       Na Goal:  135-145    ID: afebrile, no leukocytosis, monitor for si/sx of infection     OTHER:  condition and plan of care d/w patient, questions and concerns addressed. Counselling and support provided, etoh/nicotine cessation / education. Social work followup. Verbalizes/expresses full understanding.     DISPOSITION: Rehab or home depending on PT eval once stable and workup is complete      CORE MEASURES:        Admission NIHSS: 1      Tenecteplase : [] YES [x] NO      LDL/HDL/A1C: 65/66/ pending     Depression Screen- if depression hx and/or present      Statin Therapy: as noted      Dysphagia Screen: [x] PASS [] FAIL     Smoking [x] YES [] NO      Afib [] YES [x] NO     Stroke Education [x] YES [] NO    Obtain screening lower extremity venous ultrasound in patients who meet 1 or more of the following criteria as patient is high risk for DVT/PE on admission:   [] History of DVT/PE  [x]Hypercoagulable states (Factor V Leiden, Cancer, OCP, etc. )  [x]Prolonged immobility (hemiplegia/hemiparesis/post operative or any other extended immobilization)  [] Transferred from outside facility (Rehab or Long term care)  [] Age </= to 50   ASSESSMENT: 63M with PMHX MDD, Anxiety, ?Bipolar, BPH, Tobacco/THC Abuse, ETOH Abuse s/p [facility] [facility] x 10 days presented to [facility] [place]/o HA (x 1-2 weeks) and noted Blurry Vision, persistent sent in from rehab.  MRI without evidence of acute infarction, there are non specific flair foci as noted which should be further evaluated and followed.  MR angiogram demonstrated right ICA dissection and right MCA stenosis, fetal left PCA. Patient was loaded with ASA/Plavix at [facility] [place] and transferred to [facility] for  further evaluation.  Etiology of dissection unclear at this time, patient does not recall any trauma or falls but said " have not been in fight for a while".  Does report when he sleeps he sleeps with his neck bent for extended periods of time. Can not exclude connective tissue disorder vs trauma in settings of hx of ETOH.     NEURO:   -Neurologically without acute change, reports chronic right eye ptosis, notes cataracts being followed, headache resolved   -Continue close monitoring for neurologic deterioration    - Stroke neuro checks q 4 hrs  -  SBP goal 110-130mmHg being tolerated at this time, avoid rapid fluctuations and further hypotension   - TCD, w/o HITS (embolic events  in territory of dissection as that could raise concern for embolic events)  - A1C   -ANTITHROMBOTIC THERAPY: hx of etoh abuse, unclear if gastritis now with GI following for noted pancreatic lesion, if not contraindicated from medical/gi standpoint single antiplatelet with Clopidogrel 75mg daily and GI ppx.   -titrate statin to LDL goal less than 70 (if can tolerate hepatically)  -MRI imaging as noted   - Further interventional radiology follow up pending findings of noted workup and clinical course  -Dysphagia screen: pass   -TTE (pending)  -might consider hem/onc. consult pending GI and CT chest work up   -Physical therapy/OT/Speech eval/treatment.     -CARDIOVASCULAR: check TTE, cardiac monitoring w/ telemetry for now, further evaluation pending findings of noted workup                              -HEMATOLOGY: H/H without anemia, Platelets 168, patient should have all age and risk appropriate malignancy screenings with PCP or sooner if clinically suspected      DVT ppx: no acute neurological contraindication to pharmacological dvt ppx     GI: Prominence of the pancreas with mild dilatation of the main pancreatic duct. Follow-up MRI/MRCP is recommended by GI team 8/25/23       PULMONARY:  protecting airway, saturating well. There is a 1.1 cm nodular opacity at the right lung base. Would recommend chest CT to further differentiate.     RENAL: BUN/Cr within range, monitor urine output, maintain adequate hydration       Na Goal:  135-145    ID: afebrile, no leukocytosis, monitor for si/sx of infection     OTHER:  condition and plan of care d/w patient, questions and concerns addressed. Counselling and support provided, etoh/nicotine cessation / education. Social work followup. Verbalizes/expresses full understanding.     DISPOSITION: Rehab or home depending on PT eval once stable and workup is complete      CORE MEASURES:        Admission NIHSS: 1      Tenecteplase : [] YES [x] NO      LDL/HDL/A1C: 65/66/ pending     Depression Screen- if depression hx and/or present      Statin Therapy: as noted      Dysphagia Screen: [x] PASS [] FAIL     Smoking [x] YES [] NO      Afib [] YES [x] NO     Stroke Education [x] YES [] NO    Obtain screening lower extremity venous ultrasound in patients who meet 1 or more of the following criteria as patient is high risk for DVT/PE on admission:   [] History of DVT/PE  [x]Hypercoagulable states (Factor V Leiden, Cancer, OCP, etc. )  [x]Prolonged immobility (hemiplegia/hemiparesis/post operative or any other extended immobilization)  [] Transferred from outside facility (Rehab or Long term care)  [] Age </= to 50

## 2023-08-25 NOTE — PROGRESS NOTE ADULT - SUBJECTIVE AND OBJECTIVE BOX
INCOMPLETE    Preliminary note, offical recommendations pending attending review/signature     [facility] Stroke Team    CC: headache     HPI:  Patient seen/examined prior to [facility] on 8/23/23.    63M with PMHX MDD, Anxiety, ?Bipolar, BPH, Tobacco/THC Abuse, ETOH Abuse s/p [place] [facility] x 10 days presented to [facility] [place] c/o HA (x 1-2 weeks) and noted Blurry Vision. CTH negative. CTA Head/Neck concerning for possible Right  ICA Dissection. MRI Brain, MRA Head/Neck performed at [facility] [place] [facility] confirming R ICA Dissection. Patient loaded with ASA/Plavix at [facility] and transferred to [facility] for Neuro IR eval. Also noted to have episode of hypoglycemia at [facility] which resolved and was also c/o epigastric abd pain with nausea for which CT ABD/PEL revealed dilated pancreatic duct.   [facility] ED and [facility] ED discussed case with [facility] MICU and [facility] Neuro IR. No other complaints.    SUBJECTIVE: No events overnight.  No new neurologic complaints.  ROS reported negative unless otherwise noted.      acetaminophen     Tablet .. 650 milliGRAM(s) Oral every 6 hours PRN  aluminum hydroxide/magnesium hydroxide/simethicone Suspension 30 milliLiter(s) Oral every 4 hours PRN  clopidogrel Tablet 75 milliGRAM(s) Oral daily  famotidine    Tablet 20 milliGRAM(s) Oral two times a day  heparin   Injectable 5000 Unit(s) SubCutaneous every 12 hours  hydrALAZINE Injectable 5 milliGRAM(s) IV Push every 4 hours PRN  hydrOXYzine hydrochloride 50 milliGRAM(s) Oral two times a day PRN  lactated ringers. 1000 milliLiter(s) IV Continuous <Continuous>  melatonin 3 milliGRAM(s) Oral at bedtime PRN  ondansetron Injectable 4 milliGRAM(s) IV Push every 8 hours PRN  QUEtiapine 100 milliGRAM(s) Oral at bedtime  QUEtiapine 25 milliGRAM(s) Oral <User Schedule>  tamsulosin 0.4 milliGRAM(s) Oral at bedtime  traZODone 50 milliGRAM(s) Oral at bedtime      PHYSICAL EXAM:   Vital Signs Last 24 Hrs  T(C): 36.4 (25 Aug 2023 07:52), Max: 36.8 (25 Aug 2023 04:33)  T(F): 97.5 (25 Aug 2023 07:52), Max: 98.2 (25 Aug 2023 04:33)  HR: 63 (25 Aug 2023 07:52) (57 - 73)  BP: 135/96 (25 Aug 2023 07:52) (111/77 - 139/89)  BP(mean): --  RR: 16 (25 Aug 2023 07:52) (16 - 18)  SpO2: 100% (25 Aug 2023 07:52) (95% - 100%)    Parameters below as of 25 Aug 2023 07:52  Patient On (Oxygen Delivery Method): room air      General: NAD INCOMPLETE    Detailed Neurologic Exam:    Mental status: The patient is awake and alert and has normal attention span.  The patient is fully oriented in 3 spheres. The patient is oriented to current events. The patient is able to name objects, follow commands, repeat sentences.    Cranial nerves: slight left lip depression, right eye ptosis (patient states baseline from young age, hx head trauma), right pupil 2mm reactive, left pupil 3 mm reactive,  There is no visual field deficit to confrontation. Extraocular motion is full with no nystagmus. There is no ptosis. Facial sensation is intact.  alate elevates symmetrically. Tongue is midline.    Motor: There is normal bulk and tone.  There is no tremor.  Strength is 5/5 in the right arm and leg.   Strength is 5/5 in the left arm and leg.    Sensation: Intact to light touch and pin in 4 extremities, temperature sensation intact overall and symmetric, no extinction     Cerebellar: There is no dysmetria on finger to nose testing.    Gait : deferred                  LABS:                        14.8   3.97  )-----------( 168      ( 25 Aug 2023 04:44 )             43.2    08-25    138  |  105  |  15.5  ----------------------------<  77  4.0   |  23.0  |  0.96    Ca    9.0      25 Aug 2023 04:44  Phos  3.2     08-24  Mg     1.9     08-24    TPro  5.6<L>  /  Alb  3.2<L>  /  TBili  0.6  /  DBili  0.2  /  AST  19  /  ALT  18  /  AlkPhos  53  08-25                Lipid panel: pending      HgbA1c: pending       RADIOLOGY & ADDITIONAL STUDIES (independently reviewed unless otherwise noted):    PROCEDURE DATE:  08/23/2023  MRI BRAIN:  1. Several small nonspecific foci of T2 increased signal on FLAIR acquisition imaging bilaterally at the level the corona radiata and centrum semiovale ovale, with the most prominent noted on the left at the level the corona radiata, measuring approximately 7.5 mm in width x 4 mm in AP diameter, which may represent a prior infectious, inflammatory, ischemic, or demyelinating process.   No acute intracranial pathology. Specifically, no restricted diffusion. No acute ischemic event.  2. No abnormal intraparenchymal or leptomeningeal enhancement.  3. Mild paranasal sinus inflammatory changes.    MRA NECK AND [place]:  1. Dissection appreciated association with the right internal carotid artery with the proximal extent appreciated just beyond the bulb of the right internal carotid artery. The right internal carotid artery dissection persists distally to the level of the skull base/proximal petrous right internal carotid artery.  2. Findings also include suggestion of short segment stenosis involving an M2 branch of the right middle cerebral artery.  3. No evidence of aneurysm formation at the level of the [place].  4. Fetal origin of the left posterior cerebral artery.  5. Prominent right posterior communicating artery contributes to flow within the right posterior cerebral artery.  6. No evidence of aneurysm formation at the level of the [place].    8/23/2023  IMPRESSION:  CTA Neck: Findings most compatible with right cervical ICA dissection extending to the skull base. Recommend further evaluation with MRA of the neck and MRI/MRA of the brain.  CTA Head: No large vessel occlusion, significant stenosis, dissection, or saccular aneurysm.             INCOMPLETE    Preliminary note, offical recommendations pending attending review/signature     [facility] Stroke Team    CC: headache     HPI:  Patient seen/examined prior to [facility] on 8/23/23.    63M with PMHX MDD, Anxiety, ?Bipolar, BPH, Tobacco/THC Abuse, ETOH Abuse s/p [place] [facility] x 10 days presented to [facility] c/o HA (x 1-2 weeks) and noted Blurry Vision. CTH negative. CTA Head/Neck concerning for possible Right  ICA Dissection. MRI Brain, MRA Head/Neck performed at [facility] confirming R ICA Dissection. Patient loaded with ASA/Plavix at [facility] and transferred to [facility] for Neuro IR eval. Also noted to have episode of hypoglycemia at [facility] which resolved and was also c/o epigastric abd pain with nausea for which CT ABD/PEL revealed dilated pancreatic duct.   [facility] ED and [facility] ED discussed case with [facility] MICU and [facility] Neuro IR. No other complaints.    SUBJECTIVE: No events overnight.  No new neurologic complaints.  ROS reported negative unless otherwise noted.      acetaminophen     Tablet .. 650 milliGRAM(s) Oral every 6 hours PRN  aluminum hydroxide/magnesium hydroxide/simethicone Suspension 30 milliLiter(s) Oral every 4 hours PRN  clopidogrel Tablet 75 milliGRAM(s) Oral daily  famotidine    Tablet 20 milliGRAM(s) Oral two times a day  heparin   Injectable 5000 Unit(s) SubCutaneous every 12 hours  hydrALAZINE Injectable 5 milliGRAM(s) IV Push every 4 hours PRN  hydrOXYzine hydrochloride 50 milliGRAM(s) Oral two times a day PRN  lactated ringers. 1000 milliLiter(s) IV Continuous <Continuous>  melatonin 3 milliGRAM(s) Oral at bedtime PRN  ondansetron Injectable 4 milliGRAM(s) IV Push every 8 hours PRN  QUEtiapine 100 milliGRAM(s) Oral at bedtime  QUEtiapine 25 milliGRAM(s) Oral <User Schedule>  tamsulosin 0.4 milliGRAM(s) Oral at bedtime  traZODone 50 milliGRAM(s) Oral at bedtime      PHYSICAL EXAM:   Vital Signs Last 24 Hrs  T(C): 36.4 (25 Aug 2023 07:52), Max: 36.8 (25 Aug 2023 04:33)  T(F): 97.5 (25 Aug 2023 07:52), Max: 98.2 (25 Aug 2023 04:33)  HR: 63 (25 Aug 2023 07:52) (57 - 73)  BP: 135/96 (25 Aug 2023 07:52) (111/77 - 139/89)  BP(mean): --  RR: 16 (25 Aug 2023 07:52) (16 - 18)  SpO2: 100% (25 Aug 2023 07:52) (95% - 100%)    Parameters below as of 25 Aug 2023 07:52  Patient On (Oxygen Delivery Method): room air      General: NAD INCOMPLETE    Detailed Neurologic Exam:    Mental status: The patient is awake and alert and has normal attention span.  The patient is fully oriented in 3 spheres. The patient is oriented to current events. The patient is able to name objects, follow commands, repeat sentences.    Cranial nerves: slight left lip depression, right eye ptosis (patient states baseline from young age, hx head trauma), right pupil 2mm reactive, left pupil 3 mm reactive,  There is no visual field deficit to confrontation. Extraocular motion is full with no nystagmus. There is no ptosis. Facial sensation is intact.  alate elevates symmetrically. Tongue is midline.    Motor: There is normal bulk and tone.  There is no tremor.  Strength is 5/5 in the right arm and leg.   Strength is 5/5 in the left arm and leg.    Sensation: Intact to light touch and pin in 4 extremities, temperature sensation intact overall and symmetric, no extinction     Cerebellar: There is no dysmetria on finger to nose testing.    Gait : deferred                  LABS:                        14.8   3.97  )-----------( 168      ( 25 Aug 2023 04:44 )             43.2    08-25    138  |  105  |  15.5  ----------------------------<  77  4.0   |  23.0  |  0.96    Ca    9.0      25 Aug 2023 04:44  Phos  3.2     08-24  Mg     1.9     08-24    TPro  5.6<L>  /  Alb  3.2<L>  /  TBili  0.6  /  DBili  0.2  /  AST  19  /  ALT  18  /  AlkPhos  53  08-25                Lipid panel: pending      HgbA1c: pending       RADIOLOGY & ADDITIONAL STUDIES (independently reviewed unless otherwise noted):    PROCEDURE DATE:  08/23/2023  MRI BRAIN:  1. Several small nonspecific foci of T2 increased signal on FLAIR acquisition imaging bilaterally at the level the corona radiata and centrum semiovale ovale, with the most prominent noted on the left at the level the corona radiata, measuring approximately 7.5 mm in width x 4 mm in AP diameter, which may represent a prior infectious, inflammatory, ischemic, or demyelinating process.   No acute intracranial pathology. Specifically, no restricted diffusion. No acute ischemic event.  2. No abnormal intraparenchymal or leptomeningeal enhancement.  3. Mild paranasal sinus inflammatory changes.    MRA NECK AND [place]:  1. Dissection appreciated association with the right internal carotid artery with the proximal extent appreciated just beyond the bulb of the right internal carotid artery. The right internal carotid artery dissection persists distally to the level of the skull base/proximal petrous right internal carotid artery.  2. Findings also include suggestion of short segment stenosis involving an M2 branch of the right middle cerebral artery.  3. No evidence of aneurysm formation at the level of the [place].  4. Fetal origin of the left posterior cerebral artery.  5. Prominent right posterior communicating artery contributes to flow within the right posterior cerebral artery.  6. No evidence of aneurysm formation at the level of the [place].    8/23/2023  IMPRESSION:  CTA Neck: Findings most compatible with right cervical ICA dissection extending to the skull base. Recommend further evaluation with MRA of the neck and MRI/MRA of the brain.  CTA Head: No large vessel occlusion, significant stenosis, dissection, or saccular aneurysm.             INCOMPLETE    Preliminary note, offical recommendations pending attending review/signature     [facility] Stroke Team    CC: headache     HPI:  Patient seen/examined prior to [facility] on 8/23/23.    63M with PMHX MDD, Anxiety, ?Bipolar, BPH, Tobacco/THC Abuse, ETOH Abuse s/p [place] Det[facility] x 10 days [place] [facility] [place] (x 1-2 weeks) and noted Blurry Vision. CTH negative. CTA Head/Neck concerning for possible Right  ICA Dissection. MRI Brain, MRA Head/Neck performed [place] [facility] [place] R ICA Dissection. Patient loaded with ASA/Plavix at [facility] and transferred to [facility] for Neuro IR eval. Also noted to have episode of hypoglycemia at [facility] which resolved and was also c/o epigastric abd pain with nausea for which CT ABD/PEL revealed dilated pancreatic duct.   [facility] [place] and [facility] ED discussed case with [facility] MICU and [facility] Neuro IR. No other complaints.    SUBJECTIVE: No events overnight.  No new neurologic complaints.  ROS reported negative unless otherwise noted.      acetaminophen     Tablet .. 650 milliGRAM(s) Oral every 6 hours PRN  aluminum hydroxide/magnesium hydroxide/simethicone Suspension 30 milliLiter(s) Oral every 4 hours PRN  clopidogrel Tablet 75 milliGRAM(s) Oral daily  famotidine    Tablet 20 milliGRAM(s) Oral two times a day  heparin   Injectable 5000 Unit(s) SubCutaneous every 12 hours  hydrALAZINE Injectable 5 milliGRAM(s) IV Push every 4 hours PRN  hydrOXYzine hydrochloride 50 milliGRAM(s) Oral two times a day PRN  lactated ringers. 1000 milliLiter(s) IV Continuous <Continuous>  melatonin 3 milliGRAM(s) Oral at bedtime PRN  ondansetron Injectable 4 milliGRAM(s) IV Push every 8 hours PRN  QUEtiapine 100 milliGRAM(s) Oral at bedtime  QUEtiapine 25 milliGRAM(s) Oral <User Schedule>  tamsulosin 0.4 milliGRAM(s) Oral at bedtime  traZODone 50 milliGRAM(s) Oral at bedtime      PHYSICAL EXAM:   Vital Signs Last 24 Hrs  T(C): 36.4 (25 Aug 2023 07:52), Max: 36.8 (25 Aug 2023 04:33)  T(F): 97.5 (25 Aug 2023 07:52), Max: 98.2 (25 Aug 2023 04:33)  HR: 63 (25 Aug 2023 07:52) (57 - 73)  BP: 135/96 (25 Aug 2023 07:52) (111/77 - 139/89)  BP(mean): --  RR: 16 (25 Aug 2023 07:52) (16 - 18)  SpO2: 100% (25 Aug 2023 07:52) (95% - 100%)    Parameters below as of 25 Aug 2023 07:52  Patient On (Oxygen Delivery Method): room air      General: NAD INCOMPLETE    Detailed Neurologic Exam:    Mental status: The patient is awake and alert and has normal attention span.  The patient is fully oriented in 3 spheres. The patient is oriented to current events. The patient is able to name objects, follow commands, repeat sentences.    Cranial nerves: slight left lip depression, right eye ptosis (patient states baseline from young age, hx head trauma), right pupil 2mm reactive, left pupil 3 mm reactive,  There is no visual field deficit to confrontation. Extraocular motion is full with no nystagmus. There is no ptosis. Facial sensation is intact.  alate elevates symmetrically. Tongue is midline.    Motor: There is normal bulk and tone.  There is no tremor.  Strength is 5/5 in the right arm and leg.   Strength is 5/5 in the left arm and leg.    Sensation: Intact to light touch and pin in 4 extremities, temperature sensation intact overall and symmetric, no extinction     Cerebellar: There is no dysmetria on finger to nose testing.    Gait : deferred                  LABS:                        14.8   3.97  )-----------( 168      ( 25 Aug 2023 04:44 )             43.2    08-25    138  |  105  |  15.5  ----------------------------<  77  4.0   |  23.0  |  0.96    Ca    9.0      25 Aug 2023 04:44  Phos  3.2     08-24  Mg     1.9     08-24    TPro  5.6<L>  /  Alb  3.2<L>  /  TBili  0.6  /  DBili  0.2  /  AST  19  /  ALT  18  /  AlkPhos  53  08-25                Lipid panel: pending      HgbA1c: pending       RADIOLOGY & ADDITIONAL STUDIES (independently reviewed unless otherwise noted):    PROCEDURE DATE:  08/23/2023  MRI BRAIN:  1. Several small nonspecific foci of T2 increased signal on FLAIR acquisition imaging bilaterally at the level the corona radiata and centrum semiovale ovale, with the most prominent noted on the left at the level the corona radiata, measuring approximately 7.5 mm in width x 4 mm in AP diameter, which may represent a prior infectious, inflammatory, ischemic, or demyelinating process.   No acute intracranial pathology. Specifically, no restricted diffusion. No acute ischemic event.  2. No abnormal intraparenchymal or leptomeningeal enhancement.  3. Mild paranasal sinus inflammatory changes.    MRA NECK AND [place] OF [place]:  1. Dissection appreciated association with the right internal carotid artery with the proximal extent appreciated just beyond the bulb of the right internal carotid artery. The right internal carotid artery dissection persists distally to the level of the skull base/proximal petrous right internal carotid artery.  2. Findings also include suggestion of short segment stenosis involving an M2 branch of the right middle cerebral artery.  3. No evidence of aneurysm formation at the level of the [place] of [place].  4. Fetal origin of the left posterior cerebral artery.  5. Prominent right posterior communicating artery contributes to flow within the right posterior cerebral artery.  6. No evidence of aneurysm formation at the level of the [place] of [place].    8/23/2023  IMPRESSION:  CTA Neck: Findings most compatible with right cervical ICA dissection extending to the skull base. Recommend further evaluation with MRA of the neck and MRI/MRA of the brain.  CTA Head: No large vessel occlusion, significant stenosis, dissection, or saccular aneurysm.         Preliminary note, official recommendations pending attending review/signature     [facility] Stroke Team    CC: headache     HPI:  Patient seen/examined prior to midnight on 8/23/23.    63M with PMHX MDD, Anxiety, ?Bipolar, BPH, Tobacco/THC Abuse, ETOH Abuse s/p [place] Detox x 10 days [place] [facility] [place] 1-2 weeks[place] and noted Blurry Vision. CTH negative. CTA Head/Neck concerning for possible Right  ICA Dissection. MRI Brain, MRA Head/Neck [place] [facility] [place] R ICA Dissection. Patient loaded with ASA/Plavix at [facility] and transferred to [facility] for Neuro IR eval. Also noted to have episode of hypoglycemia [place] [facility] which resolved and was also c/o epigastric abd pain with nausea for which CT ABD/PEL revealed dilated pancreatic duct.   [facility] [place] and [facility] ED discussed case with [facility] [place] and [facility] Neuro IR. No other complaints.    SUBJECTIVE: No events overnight.  No new neurologic complaints.  ROS reported negative unless otherwise noted.      acetaminophen     Tablet .. 650 milliGRAM(s) Oral every 6 hours PRN  aluminum hydroxide/magnesium hydroxide/simethicone Suspension 30 milliLiter(s) Oral every 4 hours PRN  clopidogrel Tablet 75 milliGRAM(s) Oral daily  famotidine    Tablet 20 milliGRAM(s) Oral two times a day  heparin   Injectable 5000 Unit(s) SubCutaneous every 12 hours  hydrALAZINE Injectable 5 milliGRAM(s) IV Push every 4 hours PRN  hydrOXYzine hydrochloride 50 milliGRAM(s) Oral two times a day PRN  lactated ringers. 1000 milliLiter(s) IV Continuous <Continuous>  melatonin 3 milliGRAM(s) Oral at bedtime PRN  ondansetron Injectable 4 milliGRAM(s) IV Push every 8 hours PRN  QUEtiapine 100 milliGRAM(s) Oral at bedtime  QUEtiapine 25 milliGRAM(s) Oral <User Schedule>  tamsulosin 0.4 milliGRAM(s) Oral at bedtime  traZODone 50 milliGRAM(s) Oral at bedtime      PHYSICAL EXAM:   Vital Signs Last 24 Hrs  T(C): 36.4 (25 Aug 2023 07:52), Max: 36.8 (25 Aug 2023 04:33)  T(F): 97.5 (25 Aug 2023 07:52), Max: 98.2 (25 Aug 2023 04:33)  HR: 63 (25 Aug 2023 07:52) (57 - 73)  BP: 135/96 (25 Aug 2023 07:52) (111/77 - 139/89)  BP(mean): --  RR: 16 (25 Aug 2023 07:52) (16 - 18)  SpO2: 100% (25 Aug 2023 07:52) (95% - 100%)    Parameters below as of 25 Aug 2023 07:52  Patient On (Oxygen Delivery Method): room air      General: NAD     Detailed Neurologic Exam:    Mental status: The patient is awake and alert and has normal attention span.  The patient is fully oriented in 3 spheres. The patient is oriented to current events. The patient is able to name objects, follow commands, repeat sentences.    Cranial nerves: slight left lip depression, right eye ptosis (patient states baseline from young age, hx head trauma), right pupil 2mm reactive, left pupil 3 mm reactive,  There is no visual field deficit to confrontation. Extraocular motion is full with no nystagmus. There is no ptosis. Facial sensation is intact.  Palate elevates symmetrically. Tongue is midline.    Motor: There is normal bulk and tone.  There is no tremor.  Strength is 5/5 in the right arm and leg, no drift  Strength is 5/5 in the left arm and leg, no drift    Sensation: Intact to light touch and pin in 4 extremities, temperature sensation intact overall and symmetric, no extinction     Cerebellar: There is no dysmetria on finger to nose testing.    Gait : deferred          LABS:                        14.8   3.97  )-----------( 168      ( 25 Aug 2023 04:44 )             43.2    08-25    138  |  105  |  15.5  ----------------------------<  77  4.0   |  23.0  |  0.96    Ca    9.0      25 Aug 2023 04:44  Phos  3.2     08-24  Mg     1.9     08-24    TPro  5.6<L>  /  Alb  3.2<L>  /  TBili  0.6  /  DBili  0.2  /  AST  19  /  ALT  18  /  AlkPhos  53  08-25                Lipid panel: 65/66     HgbA1c: pending       RADIOLOGY & ADDITIONAL STUDIES (independently reviewed unless otherwise noted):    PROCEDURE DATE:  08/23/2023  MRI BRAIN:  1. Several small nonspecific foci of T2 increased signal on FLAIR acquisition imaging bilaterally at the level the corona radiata and centrum semiovale ovale, with the most prominent noted on the left at the level the corona radiata, measuring approximately 7.5 mm in width x 4 mm in AP diameter, which may represent a prior infectious, inflammatory, ischemic, or demyelinating process.   No acute intracranial pathology. Specifically, no restricted diffusion. No acute ischemic event.  2. No abnormal intraparenchymal or leptomeningeal enhancement.  3. Mild paranasal sinus inflammatory changes.    MRA NECK AND [place] OF [place]:  1. Dissection appreciated association with the right internal carotid artery with the proximal extent appreciated just beyond the bulb of the right internal carotid artery. The right internal carotid artery dissection persists distally to the level of the skull base/proximal petrous right internal carotid artery.  2. Findings also include suggestion of short segment stenosis involving an M2 branch of the right middle cerebral artery.  3. No evidence of aneurysm formation at the level of the [place] of [place].  4. Fetal origin of the left posterior cerebral artery.  5. Prominent right posterior communicating artery contributes to flow within the right posterior cerebral artery.  6. No evidence of aneurysm formation at the level of the [place] of [place].    8/23/2023  IMPRESSION:  CTA Neck: Findings most compatible with right cervical ICA dissection extending to the skull base. Recommend further evaluation with MRA of the neck and MRI/MRA of the brain.  CTA Head: No large vessel occlusion, significant stenosis, dissection, or saccular aneurysm.         Preliminary note, official recommendations pending attending review/signature     [facility] Stroke Team    CC: headache     HPI:  Patient seen/examined prior to midnight on 8/23/23.    63M with PMHX MDD, Anxiety, ?Bipolar, BPH, Tobacco/THC Abuse, ETOH Abuse s/p [place] Detox x 10 days presented to [facility] [facility] [place] c/o HA (x 1-2 weeks) and noted Blurry Vision. CTH negative. CTA Head/Neck concerning for possible Right  ICA Dissection. MRI Brain, MRA Head/Neck performed at [facility] [place] confirming R ICA Dissection. Patient loaded with ASA/Plavix at [facility] and transferred to [facility] for Neuro IR eval. Also noted to have episode of hypoglycemia at [facility] [place] which resolved and was also c/o epigastric abd pain with nausea for which CT ABD/PEL revealed dilated pancreatic duct.   [facility] [place] ED and [facility] ED discussed case with [facility] [place] MICU and [facility] Neuro IR. No other complaints.    SUBJECTIVE: No events overnight.  No new neurologic complaints.  ROS reported negative unless otherwise noted.      acetaminophen     Tablet .. 650 milliGRAM(s) Oral every 6 hours PRN  aluminum hydroxide/magnesium hydroxide/simethicone Suspension 30 milliLiter(s) Oral every 4 hours PRN  clopidogrel Tablet 75 milliGRAM(s) Oral daily  famotidine    Tablet 20 milliGRAM(s) Oral two times a day  heparin   Injectable 5000 Unit(s) SubCutaneous every 12 hours  hydrALAZINE Injectable 5 milliGRAM(s) IV Push every 4 hours PRN  hydrOXYzine hydrochloride 50 milliGRAM(s) Oral two times a day PRN  lactated ringers. 1000 milliLiter(s) IV Continuous <Continuous>  melatonin 3 milliGRAM(s) Oral at bedtime PRN  ondansetron Injectable 4 milliGRAM(s) IV Push every 8 hours PRN  QUEtiapine 100 milliGRAM(s) Oral at bedtime  QUEtiapine 25 milliGRAM(s) Oral <User Schedule>  tamsulosin 0.4 milliGRAM(s) Oral at bedtime  traZODone 50 milliGRAM(s) Oral at bedtime      PHYSICAL EXAM:   Vital Signs Last 24 Hrs  T(C): 36.4 (25 Aug 2023 07:52), Max: 36.8 (25 Aug 2023 04:33)  T(F): 97.5 (25 Aug 2023 07:52), Max: 98.2 (25 Aug 2023 04:33)  HR: 63 (25 Aug 2023 07:52) (57 - 73)  BP: 135/96 (25 Aug 2023 07:52) (111/77 - 139/89)  BP(mean): --  RR: 16 (25 Aug 2023 07:52) (16 - 18)  SpO2: 100% (25 Aug 2023 07:52) (95% - 100%)    Parameters below as of 25 Aug 2023 07:52  Patient On (Oxygen Delivery Method): room air      General: NAD     Detailed Neurologic Exam:    Mental status: The patient is awake and alert and has normal attention span.  The patient is fully oriented in 3 spheres. The patient is oriented to current events. The patient is able to name objects, follow commands, repeat sentences.    Cranial nerves: slight left lip depression, right eye ptosis (patient states baseline from young age, hx head trauma), right pupil 2mm reactive, left pupil 3 mm reactive,  There is no visual field deficit to confrontation. Extraocular motion is full with no nystagmus. There is no ptosis. Facial sensation is intact.  Palate elevates symmetrically. Tongue is midline.    Motor: There is normal bulk and tone.  There is no tremor.  Strength is 5/5 in the right arm and leg, no drift  Strength is 5/5 in the left arm and leg, no drift    Sensation: Intact to light touch and pin in 4 extremities, temperature sensation intact overall and symmetric, no extinction     Cerebellar: There is no dysmetria on finger to nose testing.    Gait : deferred          LABS:                        14.8   3.97  )-----------( 168      ( 25 Aug 2023 04:44 )             43.2    08-25    138  |  105  |  15.5  ----------------------------<  77  4.0   |  23.0  |  0.96    Ca    9.0      25 Aug 2023 04:44  Phos  3.2     08-24  Mg     1.9     08-24    TPro  5.6<L>  /  Alb  3.2<L>  /  TBili  0.6  /  DBili  0.2  /  AST  19  /  ALT  18  /  AlkPhos  53  08-25                Lipid panel: 65/66     HgbA1c: pending       RADIOLOGY & ADDITIONAL STUDIES (independently reviewed unless otherwise noted):    PROCEDURE DATE:  08/23/2023  MRI BRAIN:  1. Several small nonspecific foci of T2 increased signal on FLAIR acquisition imaging bilaterally at the level the corona radiata and centrum semiovale ovale, with the most prominent noted on the left at the level the corona radiata, measuring approximately 7.5 mm in width x 4 mm in AP diameter, which may represent a prior infectious, inflammatory, ischemic, or demyelinating process.   No acute intracranial pathology. Specifically, no restricted diffusion. No acute ischemic event.  2. No abnormal intraparenchymal or leptomeningeal enhancement.  3. Mild paranasal sinus inflammatory changes.    MRA NECK AND [place]:  1. Dissection appreciated association with the right internal carotid artery with the proximal extent appreciated just beyond the bulb of the right internal carotid artery. The right internal carotid artery dissection persists distally to the level of the skull base/proximal petrous right internal carotid artery.  2. Findings also include suggestion of short segment stenosis involving an M2 branch of the right middle cerebral artery.  3. No evidence of aneurysm formation at the level of the [place].  4. Fetal origin of the left posterior cerebral artery.  5. Prominent right posterior communicating artery contributes to flow within the right posterior cerebral artery.  6. No evidence of aneurysm formation at the level of the [place].    8/23/2023  IMPRESSION:  CTA Neck: Findings most compatible with right cervical ICA dissection extending to the skull base. Recommend further evaluation with MRA of the neck and MRI/MRA of the brain.  CTA Head: No large vessel occlusion, significant stenosis, dissection, or saccular aneurysm.         Preliminary note, official recommendations pending attending review/signature     [facility] Stroke Team    CC: headache     HPI:  Patient seen/examined prior to midnight on 8/23/23.    63M with PMHX MDD, Anxiety, ?Bipolar, BPH, Tobacco/THC Abuse, ETOH Abuse s/p [place] Detox x 10 days presented to [facility] c/o HA (x 1-2 weeks) and noted Blurry Vision. CTH negative. CTA Head/Neck concerning for possible Right  ICA Dissection. MRI Brain, MRA Head/Neck performed at [facility] [place] confirming R ICA Dissection. [place] [facility] [facility] for Neuro IR eval. Also noted to have episode of hypoglycemia at [facility] [place] [facility] which resolved and was also c/o epigastric abd pain with nausea for which CT ABD/PEL revealed dilated pancreatic duct.   [facility] ED and [facility] ED discussed case with [facility] MICU and [facility] Neuro IR. No other complaints.    SUBJECTIVE: No events overnight.  No new neurologic complaints.  ROS reported negative unless otherwise noted.      acetaminophen     Tablet .. 650 milliGRAM(s) Oral every 6 hours PRN  aluminum hydroxide/magnesium hydroxide/simethicone Suspension 30 milliLiter(s) Oral every 4 hours PRN  clopidogrel Tablet 75 milliGRAM(s) Oral daily  famotidine    Tablet 20 milliGRAM(s) Oral two times a day  heparin   Injectable 5000 Unit(s) SubCutaneous every 12 hours  hydrALAZINE Injectable 5 milliGRAM(s) IV Push every 4 hours PRN  hydrOXYzine hydrochloride 50 milliGRAM(s) Oral two times a day PRN  lactated ringers. 1000 milliLiter(s) IV Continuous <Continuous>  melatonin 3 milliGRAM(s) Oral at bedtime PRN  ondansetron Injectable 4 milliGRAM(s) IV Push every 8 hours PRN  QUEtiapine 100 milliGRAM(s) Oral at bedtime  QUEtiapine 25 milliGRAM(s) Oral <User Schedule>  tamsulosin 0.4 milliGRAM(s) Oral at bedtime  traZODone 50 milliGRAM(s) Oral at bedtime      PHYSICAL EXAM:   Vital Signs Last 24 Hrs  T(C): 36.4 (25 Aug 2023 07:52), Max: 36.8 (25 Aug 2023 04:33)  T(F): 97.5 (25 Aug 2023 07:52), Max: 98.2 (25 Aug 2023 04:33)  HR: 63 (25 Aug 2023 07:52) (57 - 73)  BP: 135/96 (25 Aug 2023 07:52) (111/77 - 139/89)  BP(mean): --  RR: 16 (25 Aug 2023 07:52) (16 - 18)  SpO2: 100% (25 Aug 2023 07:52) (95% - 100%)    Parameters below as of 25 Aug 2023 07:52  Patient On (Oxygen Delivery Method): room air      General: NAD     Detailed Neurologic Exam:    Mental status: The patient is awake and alert and has normal attention span.  The patient is fully oriented in 3 spheres. The patient is oriented to current events. The patient is able to name objects, follow commands, repeat sentences.    Cranial nerves: slight left lip depression, right eye ptosis (patient states baseline from young age, hx head trauma), right pupil 2mm reactive, left pupil 3 mm reactive,  There is no visual field deficit to confrontation. Extraocular motion is full with no nystagmus. There is no ptosis. Facial sensation is intact.  Palate elevates symmetrically. Tongue is midline.    Motor: There is normal bulk and tone.  There is no tremor.  Strength is 5/5 in the right arm and leg, no drift  Strength is 5/5 in the left arm and leg, no drift    Sensation: Intact to light touch and pin in 4 extremities, temperature sensation intact overall and symmetric, no extinction     Cerebellar: There is no dysmetria on finger to nose testing.    Gait : deferred          LABS:                        14.8   3.97  )-----------( 168      ( 25 Aug 2023 04:44 )             43.2    08-25    138  |  105  |  15.5  ----------------------------<  77  4.0   |  23.0  |  0.96    Ca    9.0      25 Aug 2023 04:44  Phos  3.2     08-24  Mg     1.9     08-24    TPro  5.6<L>  /  Alb  3.2<L>  /  TBili  0.6  /  DBili  0.2  /  AST  19  /  ALT  18  /  AlkPhos  53  08-25                Lipid panel: 65/66     HgbA1c: pending       RADIOLOGY & ADDITIONAL STUDIES (independently reviewed unless otherwise noted):    PROCEDURE DATE:  08/23/2023  MRI BRAIN:  1. Several small nonspecific foci of T2 increased signal on FLAIR acquisition imaging bilaterally at the level the corona radiata and centrum semiovale ovale, with the most prominent noted on the left at the level the corona radiata, measuring approximately 7.5 mm in width x 4 mm in AP diameter, which may represent a prior infectious, inflammatory, ischemic, or demyelinating process.   No acute intracranial pathology. Specifically, no restricted diffusion. No acute ischemic event.  2. No abnormal intraparenchymal or leptomeningeal enhancement.  3. Mild paranasal sinus inflammatory changes.    MRA NECK AND [place]:  1. Dissection appreciated association with the right internal carotid artery with the proximal extent appreciated just beyond the bulb of the right internal carotid artery. The right internal carotid artery dissection persists distally to the level of the skull base/proximal petrous right internal carotid artery.  2. Findings also include suggestion of short segment stenosis involving an M2 branch of the right middle cerebral artery.  3. No evidence of aneurysm formation at the level of the [place].  4. Fetal origin of the left posterior cerebral artery.  5. Prominent right posterior communicating artery contributes to flow within the right posterior cerebral artery.  6. No evidence of aneurysm formation at the level of the [place].    8/23/2023  IMPRESSION:  CTA Neck: Findings most compatible with right cervical ICA dissection extending to the skull base. Recommend further evaluation with MRA of the neck and MRI/MRA of the brain.  CTA Head: No large vessel occlusion, significant stenosis, dissection, or saccular aneurysm.           [facility] Stroke Team    CC: headache     HPI:  63M with PMHX MDD, Anxiety, ?Bipolar, BPH, Tobacco/THC Abuse, ETOH Abuse s/p [place] Detox x 10 days [place] [facility] [place] (x 1-2 weeks) and noted Blurry Vision. CTH negative. CTA Head/Neck concerning for possible Right  ICA Dissection. MRI Brain, MRA Head/Neck performed at [facility] confirming R ICA Dissection. Patient [place]SA[place]av[place] [facility] [place] and [place] to [facility] for Neuro IR eval. Also noted to have episode of hypoglycemia at [facility] which resolved and was also c/o epigastric abd pain with nausea for which CT ABD/PEL revealed dilated pancreatic duct.   [facility] ED and [facility] ED discussed case with [facility] MICU and [facility] Neuro IR. No other complaints.    SUBJECTIVE: No events overnight.  No new neurologic complaints.  ROS reported negative unless otherwise noted.      acetaminophen     Tablet .. 650 milliGRAM(s) Oral every 6 hours PRN  aluminum hydroxide/magnesium hydroxide/simethicone Suspension 30 milliLiter(s) Oral every 4 hours PRN  clopidogrel Tablet 75 milliGRAM(s) Oral daily  famotidine    Tablet 20 milliGRAM(s) Oral two times a day  heparin   Injectable 5000 Unit(s) SubCutaneous every 12 hours  hydrALAZINE Injectable 5 milliGRAM(s) IV Push every 4 hours PRN  hydrOXYzine hydrochloride 50 milliGRAM(s) Oral two times a day PRN  lactated ringers. 1000 milliLiter(s) IV Continuous <Continuous>  melatonin 3 milliGRAM(s) Oral at bedtime PRN  ondansetron Injectable 4 milliGRAM(s) IV Push every 8 hours PRN  QUEtiapine 100 milliGRAM(s) Oral at bedtime  QUEtiapine 25 milliGRAM(s) Oral <User Schedule>  tamsulosin 0.4 milliGRAM(s) Oral at bedtime  traZODone 50 milliGRAM(s) Oral at bedtime      PHYSICAL EXAM:   Vital Signs Last 24 Hrs  T(C): 36.4 (25 Aug 2023 07:52), Max: 36.8 (25 Aug 2023 04:33)  T(F): 97.5 (25 Aug 2023 07:52), Max: 98.2 (25 Aug 2023 04:33)  HR: 63 (25 Aug 2023 07:52) (57 - 73)  BP: 135/96 (25 Aug 2023 07:52) (111/77 - 139/89)  BP(mean): --  RR: 16 (25 Aug 2023 07:52) (16 - 18)  SpO2: 100% (25 Aug 2023 07:52) (95% - 100%)    Parameters below as of 25 Aug 2023 07:52  Patient On (Oxygen Delivery Method): room air      General: NAD     Detailed Neurologic Exam:    Mental status: The patient is awake and alert and has normal attention span.  The patient is fully oriented in 3 spheres. The patient is oriented to current events. The patient is able to name objects, follow commands, repeat sentences.    Cranial nerves: slight left lip depression, right eye ptosis (patient states baseline from young age, hx head trauma), right pupil 2mm reactive, left pupil 3 mm reactive,  There is no visual field deficit to confrontation. Extraocular motion is full with no nystagmus. There is no ptosis. Facial sensation is intact.  Palate elevates symmetrically. Tongue is midline.    Motor: There is normal bulk and tone.  There is no tremor.  Strength is 5/5 in the right arm and leg, no drift  Strength is 5/5 in the left arm and leg, no drift    Sensation: Intact to light touch and pin in 4 extremities, temperature sensation intact overall and symmetric, no extinction     Cerebellar: There is no dysmetria on finger to nose testing.    Gait : deferred          LABS:                        14.8   3.97  )-----------( 168      ( 25 Aug 2023 04:44 )             43.2    08-25    138  |  105  |  15.5  ----------------------------<  77  4.0   |  23.0  |  0.96    Ca    9.0      25 Aug 2023 04:44  Phos  3.2     08-24  Mg     1.9     08-24    TPro  5.6<L>  /  Alb  3.2<L>  /  TBili  0.6  /  DBili  0.2  /  AST  19  /  ALT  18  /  AlkPhos  53  08-25                Lipid panel: 65/66     HgbA1c: pending       RADIOLOGY & ADDITIONAL STUDIES (independently reviewed unless otherwise noted):    PROCEDURE DATE:  08/23/2023  MRI BRAIN:  1. Several small nonspecific foci of T2 increased signal on FLAIR acquisition imaging bilaterally at the level the corona radiata and centrum semiovale ovale, with the most prominent noted on the left at the level the corona radiata, measuring approximately 7.5 mm in width x 4 mm in AP diameter, which may represent a prior infectious, inflammatory, ischemic, or demyelinating process.   No acute intracranial pathology. Specifically, no restricted diffusion. No acute ischemic event.  2. No abnormal intraparenchymal or leptomeningeal enhancement.  3. Mild paranasal sinus inflammatory changes.    MRA NECK AND [place]:  1. Dissection appreciated association with the right internal carotid artery with the proximal extent appreciated just beyond the bulb of the right internal carotid artery. The right internal carotid artery dissection persists distally to the level of the skull base/proximal petrous right internal carotid artery.  2. Findings also include suggestion of short segment stenosis involving an M2 branch of the right middle cerebral artery.  3. No evidence of aneurysm formation at the level of the [place] of [place].  4. Fetal origin of the left posterior cerebral artery.  5. Prominent right posterior communicating artery contributes to flow within the right posterior cerebral artery.  6. No evidence of aneurysm formation at the level of the [place] of [place].    8/23/2023  IMPRESSION:  CTA Neck: Findings most compatible with right cervical ICA dissection extending to the skull base. Recommend further evaluation with MRA of the neck and MRI/MRA of the brain.  CTA Head: No large vessel occlusion, significant stenosis, dissection, or saccular aneurysm.           [facility] Stroke Team    CC: headache     HPI:  63M with PMHX MDD, Anxiety, ?Bipolar, BPH, Tobacco/THC Abuse, ETOH Abuse s/p [place] Detox x 10 days [place] [facility] [place]-2 [place] and noted Blurry Vision. CTH negative. CTA Head/Neck concerning for possible Right  ICA Dissection. MRI Brain, MRA Head/Neck [place] [facility] [place] R ICA Dissection. Patient loaded with ASA/Plavix at [facility] and transferred to [facility] for Neuro IR eval. Also noted to have episode of hypoglycemia at [facility] which [place] and was also c/o epigastric abd pain with nausea for which CT ABD/PEL revealed dilated pancreatic duct.   [facility] [place] and [facility] ED discussed case with [facility] [place]U and [facility] Neuro IR. No other complaints.    SUBJECTIVE: No events overnight.  No new neurologic complaints.  ROS reported negative unless otherwise noted.      acetaminophen     Tablet .. 650 milliGRAM(s) Oral every 6 hours PRN  aluminum hydroxide/magnesium hydroxide/simethicone Suspension 30 milliLiter(s) Oral every 4 hours PRN  clopidogrel Tablet 75 milliGRAM(s) Oral daily  famotidine    Tablet 20 milliGRAM(s) Oral two times a day  heparin   Injectable 5000 Unit(s) SubCutaneous every 12 hours  hydrALAZINE Injectable 5 milliGRAM(s) IV Push every 4 hours PRN  hydrOXYzine hydrochloride 50 milliGRAM(s) Oral two times a day PRN  lactated ringers. 1000 milliLiter(s) IV Continuous <Continuous>  melatonin 3 milliGRAM(s) Oral at bedtime PRN  ondansetron Injectable 4 milliGRAM(s) IV Push every 8 hours PRN  QUEtiapine 100 milliGRAM(s) Oral at bedtime  QUEtiapine 25 milliGRAM(s) Oral <User Schedule>  tamsulosin 0.4 milliGRAM(s) Oral at bedtime  traZODone 50 milliGRAM(s) Oral at bedtime      PHYSICAL EXAM:   Vital Signs Last 24 Hrs  T(C): 36.4 (25 Aug 2023 07:52), Max: 36.8 (25 Aug 2023 04:33)  T(F): 97.5 (25 Aug 2023 07:52), Max: 98.2 (25 Aug 2023 04:33)  HR: 63 (25 Aug 2023 07:52) (57 - 73)  BP: 135/96 (25 Aug 2023 07:52) (111/77 - 139/89)  BP(mean): --  RR: 16 (25 Aug 2023 07:52) (16 - 18)  SpO2: 100% (25 Aug 2023 07:52) (95% - 100%)    Parameters below as of 25 Aug 2023 07:52  Patient On (Oxygen Delivery Method): room air      General: NAD     Detailed Neurologic Exam:    Mental status: The patient is awake and alert and has normal attention span.  The patient is fully oriented in 3 spheres. The patient is oriented to current events. The patient is able to name objects, follow commands, repeat sentences.    Cranial nerves: slight left lip depression, right eye ptosis (patient states baseline from young age, hx head trauma), right pupil 2mm reactive, left pupil 3 mm reactive,  There is no visual field deficit to confrontation. Extraocular motion is full with no nystagmus. There is no ptosis. Facial sensation is intact.  Palate elevates symmetrically. Tongue is midline.    Motor: There is normal bulk and tone.  There is no tremor.  Strength is 5/5 in the right arm and leg, no drift  Strength is 5/5 in the left arm and leg, no drift    Sensation: Intact to light touch and pin in 4 extremities, temperature sensation intact overall and symmetric, no extinction     Cerebellar: There is no dysmetria on finger to nose testing.    Gait : deferred          LABS:                        14.8   3.97  )-----------( 168      ( 25 Aug 2023 04:44 )             43.2    08-25    138  |  105  |  15.5  ----------------------------<  77  4.0   |  23.0  |  0.96    Ca    9.0      25 Aug 2023 04:44  Phos  3.2     08-24  Mg     1.9     08-24    TPro  5.6<L>  /  Alb  3.2<L>  /  TBili  0.6  /  DBili  0.2  /  AST  19  /  ALT  18  /  AlkPhos  53  08-25                Lipid panel: 65/66     HgbA1c: pending       RADIOLOGY & ADDITIONAL STUDIES (independently reviewed unless otherwise noted):    PROCEDURE DATE:  08/23/2023  MRI BRAIN:  1. Several small nonspecific foci of T2 increased signal on FLAIR acquisition imaging bilaterally at the level the corona radiata and centrum semiovale ovale, with the most prominent noted on the left at the level the corona radiata, measuring approximately 7.5 mm in width x 4 mm in AP diameter, which may represent a prior infectious, inflammatory, ischemic, or demyelinating process.   No acute intracranial pathology. Specifically, no restricted diffusion. No acute ischemic event.  2. No abnormal intraparenchymal or leptomeningeal enhancement.  3. Mild paranasal sinus inflammatory changes.    MRA NECK AND [place]:  1. Dissection appreciated association with the right internal carotid artery with the proximal extent appreciated just beyond the bulb of the right internal carotid artery. The right internal carotid artery dissection persists distally to the level of the skull base/proximal petrous right internal carotid artery.  2. Findings also include suggestion of short segment stenosis involving an M2 branch of the right middle cerebral artery.  3. No evidence of aneurysm formation at the level of the [place].  4. Fetal origin of the left posterior cerebral artery.  5. Prominent right posterior communicating artery contributes to flow within the right posterior cerebral artery.  6. No evidence of aneurysm formation at the level of the [place].    8/23/2023  IMPRESSION:  CTA Neck: Findings most compatible with right cervical ICA dissection extending to the skull base. Recommend further evaluation with MRA of the neck and MRI/MRA of the brain.  CTA Head: No large vessel occlusion, significant stenosis, dissection, or saccular aneurysm.           [facility] Stroke Team    CC: headache     HPI:  63M with PMHX MDD, Anxiety, ?Bipolar, BPH, Tobacco/THC Abuse, ETOH Abuse s/p [place] Detox x 10 days presented to [facility] [place] HA (x 1-2 weeks) and noted Blurry Vision. CTH negative. CTA Head/Neck concerning for possible Right  ICA Dissection. MRI Brain, MRA Head/Neck performed at [facility] [place] confirming R ICA Dissection. Patient loaded with ASA/Plavix at [facility] [place] and transferred to [facility] for Neuro IR eval. Also noted to have episode of hypoglycemia at [facility] which resolved and was also c/o epigastric abd pain with nausea for which CT ABD/PEL revealed dilated pancreatic duct.   [facility] ED and [facility] ED discussed case with [facility] MICU and [facility] Neuro IR. No other complaints.    SUBJECTIVE: No events overnight.  No new neurologic complaints.  ROS reported negative unless otherwise noted.      acetaminophen     Tablet .. 650 milliGRAM(s) Oral every 6 hours PRN  aluminum hydroxide/magnesium hydroxide/simethicone Suspension 30 milliLiter(s) Oral every 4 hours PRN  clopidogrel Tablet 75 milliGRAM(s) Oral daily  famotidine    Tablet 20 milliGRAM(s) Oral two times a day  heparin   Injectable 5000 Unit(s) SubCutaneous every 12 hours  hydrALAZINE Injectable 5 milliGRAM(s) IV Push every 4 hours PRN  hydrOXYzine hydrochloride 50 milliGRAM(s) Oral two times a day PRN  lactated ringers. 1000 milliLiter(s) IV Continuous <Continuous>  melatonin 3 milliGRAM(s) Oral at bedtime PRN  ondansetron Injectable 4 milliGRAM(s) IV Push every 8 hours PRN  QUEtiapine 100 milliGRAM(s) Oral at bedtime  QUEtiapine 25 milliGRAM(s) Oral <User Schedule>  tamsulosin 0.4 milliGRAM(s) Oral at bedtime  traZODone 50 milliGRAM(s) Oral at bedtime      PHYSICAL EXAM:   Vital Signs Last 24 Hrs  T(C): 36.4 (25 Aug 2023 07:52), Max: 36.8 (25 Aug 2023 04:33)  T(F): 97.5 (25 Aug 2023 07:52), Max: 98.2 (25 Aug 2023 04:33)  HR: 63 (25 Aug 2023 07:52) (57 - 73)  BP: 135/96 (25 Aug 2023 07:52) (111/77 - 139/89)  BP(mean): --  RR: 16 (25 Aug 2023 07:52) (16 - 18)  SpO2: 100% (25 Aug 2023 07:52) (95% - 100%)    Parameters below as of 25 Aug 2023 07:52  Patient On (Oxygen Delivery Method): room air      General: NAD     Detailed Neurologic Exam:    Mental status: The patient is awake and alert and has normal attention span.  The patient is fully oriented in 3 spheres. The patient is oriented to current events. The patient is able to name objects, follow commands, repeat sentences.    Cranial nerves: slight left lip depression, right eye ptosis (patient states baseline from young age, hx head trauma), right pupil 2mm reactive, left pupil 3 mm reactive,  There is no visual field deficit to confrontation. Extraocular motion is full with no nystagmus. There is no ptosis. Facial sensation is intact.  Palate elevates symmetrically. Tongue is midline.    Motor: There is normal bulk and tone.  There is no tremor.  Strength is 5/5 in the right arm and leg, no drift  Strength is 5/5 in the left arm and leg, no drift    Sensation: Intact to light touch and pin in 4 extremities, temperature sensation intact overall and symmetric, no extinction     Cerebellar: There is no dysmetria on finger to nose testing.    Gait : deferred          LABS:                        14.8   3.97  )-----------( 168      ( 25 Aug 2023 04:44 )             43.2    08-25    138  |  105  |  15.5  ----------------------------<  77  4.0   |  23.0  |  0.96    Ca    9.0      25 Aug 2023 04:44  Phos  3.2     08-24  Mg     1.9     08-24    TPro  5.6<L>  /  Alb  3.2<L>  /  TBili  0.6  /  DBili  0.2  /  AST  19  /  ALT  18  /  AlkPhos  53  08-25                Lipid panel: 65/66     HgbA1c: pending       RADIOLOGY & ADDITIONAL STUDIES (independently reviewed unless otherwise noted):    PROCEDURE DATE:  08/23/2023  MRI BRAIN:  1. Several small nonspecific foci of T2 increased signal on FLAIR acquisition imaging bilaterally at the level the corona radiata and centrum semiovale ovale, with the most prominent noted on the left at the level the corona radiata, measuring approximately 7.5 mm in width x 4 mm in AP diameter, which may represent a prior infectious, inflammatory, ischemic, or demyelinating process.   No acute intracranial pathology. Specifically, no restricted diffusion. No acute ischemic event.  2. No abnormal intraparenchymal or leptomeningeal enhancement.  3. Mild paranasal sinus inflammatory changes.    MRA NECK AND [place]:  1. Dissection appreciated association with the right internal carotid artery with the proximal extent appreciated just beyond the bulb of the right internal carotid artery. The right internal carotid artery dissection persists distally to the level of the skull base/proximal petrous right internal carotid artery.  2. Findings also include suggestion of short segment stenosis involving an M2 branch of the right middle cerebral artery.  3. No evidence of aneurysm formation at the level of the [place].  4. Fetal origin of the left posterior cerebral artery.  5. Prominent right posterior communicating artery contributes to flow within the right posterior cerebral artery.  6. No evidence of aneurysm formation at the level of the [place].    8/23/2023  IMPRESSION:  CTA Neck: Findings most compatible with right cervical ICA dissection extending to the skull base. Recommend further evaluation with MRA of the neck and MRI/MRA of the brain.  CTA Head: No large vessel occlusion, significant stenosis, dissection, or saccular aneurysm.

## 2023-08-25 NOTE — PROGRESS NOTE ADULT - ASSESSMENT
63M with PMHX MDD, Anxiety, ?Bipolar, BPH, Tobacco/THC Abuse, ETOH Abuse s/p [facility] x[facility] days presents to [facility] [place] c/o HA and Blurry Vision admitted for R ICA Dissection also found to have episode of Hypoglycemia and Epigastric Pain/Dilated Pancreatic Duct.    *R ICA Dissection  NIHSS 0   Neurochecks q2  IVF LR 75cc/hr  Goal SBP <140   Hydralazine 5mg IV PRN SBP >140. Avoid BB with Bradycardia.  ASA 325mg PO and Plavix 300mg x1 Loaded at [facility]  Will DC ASA for now   Plavix 75mg q24 with Pepcid 20mg BID   No need for heparin gtt given symptoms >1 month duration per Neuro IR   VTE PPX SCD/SQH  Neuro IR consulted noted    *Bradycardia  Monitor Telemetry. Check EKG.  Hydralazine 5mg PRN SBP >140  Avoid labetalol with bradycardia  BP stable. Asymptomatic. Monitor VS q2    *Hypoglycemia  One episode at [facility] resolved PTA  Accuchecks q4  restart PO diet today     *Incidental Dilated Pancreatic Duct r/o Pancreatitis v Underlying Pathology  Seen on CTAP from [facility] 8/23  LFTs/Lipase noted  GI consult appreciate it   MRCP ordered   Xanax 30min prior     *Incidental Nodular Opacity  1.1cm nodular opactiy on ct Abd/Pel  Outpatient F/u for repeat CT Chest on discharge    *MDD, Anxiety, ?Bipolar Disorder  Hydroxyzine 50mg BID PRN Anxiety  Quetiapine 25mg qAM + 100mg qPM  Trazodone 50mg qHS    *BPH  Flomax 0.4mg qHS    *ETOH Abuse, THC Abuse, Tobacco Abuse  s/p [facility]. Unclear if patient was on treatment taper.  No signs/symptoms of withdrawal at this time. Monitor closely in case repeat CIWA protocol required.   Cessation 63M with PMHX MDD, Anxiety, ?Bipolar, BPH, Tobacco/THC Abuse, ETOH Abuse s/p [facility] x[facility] days presents to [facility] c/o HA and Blurry Vision admitted for R ICA Dissection also found to have episode of Hypoglycemia and Epigastric Pain/Dilated Pancreatic Duct.    *R ICA Dissection  NIHSS 0   Neurochecks q2  IVF LR 75cc/hr  Goal SBP <140   Hydralazine 5mg IV PRN SBP >140. Avoid BB with Bradycardia.  ASA 325mg PO and Plavix 300mg x1 Loaded at [facility]  Will DC ASA for now   Plavix 75mg q24 with Pepcid 20mg BID   No need for heparin gtt given symptoms >1 month duration per Neuro IR   VTE PPX SCD/SQH  Neuro IR consulted noted    *Bradycardia  Monitor Telemetry. Check EKG.  Hydralazine 5mg PRN SBP >140  Avoid labetalol with bradycardia  BP stable. Asymptomatic. Monitor VS q2    *Hypoglycemia  One episode at [facility] resolved PTA  Accuchecks q4  restart PO diet today     *Incidental Dilated Pancreatic Duct r/o Pancreatitis v Underlying Pathology  Seen on CTAP from [facility] 8/23  LFTs/Lipase noted  GI consult appreciate it   MRCP ordered   Xanax 30min prior     *Incidental Nodular Opacity  1.1cm nodular opactiy on ct Abd/Pel  Outpatient F/u for repeat CT Chest on discharge    *MDD, Anxiety, ?Bipolar Disorder  Hydroxyzine 50mg BID PRN Anxiety  Quetiapine 25mg qAM + 100mg qPM  Trazodone 50mg qHS    *BPH  Flomax 0.4mg qHS    *ETOH Abuse, THC Abuse, Tobacco Abuse  s/p [facility]. Unclear if patient was on treatment taper.  No signs/symptoms of withdrawal at this time. Monitor closely in case repeat CIWA protocol required.   Cessation 63M with PMHX MDD, Anxiety, ?Bipolar, BPH, Tobacco/THC Abuse, ETOH Abuse s/p [facility] x[facility] days presents to [facility] [place] c/o HA and Blurry Vision admitted for R ICA Dissection also found to have episode of Hypoglycemia and Epigastric Pain/Dilated Pancreatic Duct.    *R ICA Dissection  NIHSS 0   Neurochecks q2  IVF LR 75cc/hr  Goal SBP <140   Hydralazine 5mg IV PRN SBP >140. Avoid BB with Bradycardia.  ASA 325mg PO and Plavix 300mg x1 Loaded at [facility] [place]  Will DC ASA for now   Plavix 75mg q24 with Pepcid 20mg BID   No need for heparin gtt given symptoms >1 month duration per Neuro IR   VTE PPX SCD/SQH  Neuro IR consulted noted    *Bradycardia  Monitor Telemetry. Check EKG.  Hydralazine 5mg PRN SBP >140  Avoid labetalol with bradycardia  BP stable. Asymptomatic. Monitor VS q2    *Hypoglycemia  One episode at [facility] resolved PTA  Accuchecks q4  restart PO diet today     *Incidental Dilated Pancreatic Duct r/o Pancreatitis v Underlying Pathology  Seen on CTAP from [facility] [place] 8/23  LFTs/Lipase noted  GI consult appreciate it   MRCP ordered   Xanax 30min prior     *Incidental Nodular Opacity  1.1cm nodular opactiy on ct Abd/Pel  Outpatient F/u for repeat CT Chest on discharge    *MDD, Anxiety, ?Bipolar Disorder  Hydroxyzine 50mg BID PRN Anxiety  Quetiapine 25mg qAM + 100mg qPM  Trazodone 50mg qHS    *BPH  Flomax 0.4mg qHS    *ETOH Abuse, THC Abuse, Tobacco Abuse  s/p [facility]. Unclear if patient was on treatment taper.  No signs/symptoms of withdrawal at this time. Monitor closely in case repeat CIWA protocol required.   Cessation

## 2023-08-26 LAB
A1C WITH ESTIMATED AVERAGE GLUCOSE RESULT: 5.2 % — SIGNIFICANT CHANGE UP (ref 4–5.6)
ANION GAP SERPL CALC-SCNC: 8 MMOL/L — SIGNIFICANT CHANGE UP (ref 5–17)
BUN SERPL-MCNC: 15.5 MG/DL — SIGNIFICANT CHANGE UP (ref 8–20)
CALCIUM SERPL-MCNC: 9 MG/DL — SIGNIFICANT CHANGE UP (ref 8.4–10.5)
CHLORIDE SERPL-SCNC: 104 MMOL/L — SIGNIFICANT CHANGE UP (ref 96–108)
CHOLEST SERPL-MCNC: 150 MG/DL — SIGNIFICANT CHANGE UP
CO2 SERPL-SCNC: 26 MMOL/L — SIGNIFICANT CHANGE UP (ref 22–29)
CREAT SERPL-MCNC: 1.05 MG/DL — SIGNIFICANT CHANGE UP (ref 0.5–1.3)
EGFR: 79 ML/MIN/1.73M2 — SIGNIFICANT CHANGE UP
ESTIMATED AVERAGE GLUCOSE: 103 MG/DL — SIGNIFICANT CHANGE UP (ref 68–114)
GLUCOSE BLDC GLUCOMTR-MCNC: 114 MG/DL — HIGH (ref 70–99)
GLUCOSE BLDC GLUCOMTR-MCNC: 155 MG/DL — HIGH (ref 70–99)
GLUCOSE BLDC GLUCOMTR-MCNC: 184 MG/DL — HIGH (ref 70–99)
GLUCOSE BLDC GLUCOMTR-MCNC: 76 MG/DL — SIGNIFICANT CHANGE UP (ref 70–99)
GLUCOSE BLDC GLUCOMTR-MCNC: 87 MG/DL — SIGNIFICANT CHANGE UP (ref 70–99)
GLUCOSE SERPL-MCNC: 85 MG/DL — SIGNIFICANT CHANGE UP (ref 70–99)
HCT VFR BLD CALC: 43.6 % — SIGNIFICANT CHANGE UP (ref 39–50)
HDLC SERPL-MCNC: 72 MG/DL — SIGNIFICANT CHANGE UP
HGB BLD-MCNC: 14.6 G/DL — SIGNIFICANT CHANGE UP (ref 13–17)
LIPID PNL WITH DIRECT LDL SERPL: 64 MG/DL — SIGNIFICANT CHANGE UP
MCHC RBC-ENTMCNC: 30.8 PG — SIGNIFICANT CHANGE UP (ref 27–34)
MCHC RBC-ENTMCNC: 33.5 GM/DL — SIGNIFICANT CHANGE UP (ref 32–36)
MCV RBC AUTO: 92 FL — SIGNIFICANT CHANGE UP (ref 80–100)
NON HDL CHOLESTEROL: 78 MG/DL — SIGNIFICANT CHANGE UP
PLATELET # BLD AUTO: 180 K/UL — SIGNIFICANT CHANGE UP (ref 150–400)
POTASSIUM SERPL-MCNC: 4 MMOL/L — SIGNIFICANT CHANGE UP (ref 3.5–5.3)
POTASSIUM SERPL-SCNC: 4 MMOL/L — SIGNIFICANT CHANGE UP (ref 3.5–5.3)
RBC # BLD: 4.74 M/UL — SIGNIFICANT CHANGE UP (ref 4.2–5.8)
RBC # FLD: 13.2 % — SIGNIFICANT CHANGE UP (ref 10.3–14.5)
SODIUM SERPL-SCNC: 138 MMOL/L — SIGNIFICANT CHANGE UP (ref 135–145)
TRIGL SERPL-MCNC: 68 MG/DL — SIGNIFICANT CHANGE UP
WBC # BLD: 3.54 K/UL — LOW (ref 3.8–10.5)
WBC # FLD AUTO: 3.54 K/UL — LOW (ref 3.8–10.5)

## 2023-08-26 PROCEDURE — 99232 SBSQ HOSP IP/OBS MODERATE 35: CPT

## 2023-08-26 RX ADMIN — Medication 50 MILLIGRAM(S): at 21:21

## 2023-08-26 RX ADMIN — QUETIAPINE FUMARATE 100 MILLIGRAM(S): 200 TABLET, FILM COATED ORAL at 21:22

## 2023-08-26 RX ADMIN — HEPARIN SODIUM 5000 UNIT(S): 5000 INJECTION INTRAVENOUS; SUBCUTANEOUS at 18:29

## 2023-08-26 RX ADMIN — FAMOTIDINE 20 MILLIGRAM(S): 10 INJECTION INTRAVENOUS at 05:42

## 2023-08-26 RX ADMIN — TAMSULOSIN HYDROCHLORIDE 0.4 MILLIGRAM(S): 0.4 CAPSULE ORAL at 21:21

## 2023-08-26 RX ADMIN — QUETIAPINE FUMARATE 25 MILLIGRAM(S): 200 TABLET, FILM COATED ORAL at 05:42

## 2023-08-26 RX ADMIN — CLOPIDOGREL BISULFATE 75 MILLIGRAM(S): 75 TABLET, FILM COATED ORAL at 13:02

## 2023-08-26 RX ADMIN — HEPARIN SODIUM 5000 UNIT(S): 5000 INJECTION INTRAVENOUS; SUBCUTANEOUS at 05:42

## 2023-08-26 RX ADMIN — QUETIAPINE FUMARATE 100 MILLIGRAM(S): 200 TABLET, FILM COATED ORAL at 00:36

## 2023-08-26 RX ADMIN — FAMOTIDINE 20 MILLIGRAM(S): 10 INJECTION INTRAVENOUS at 18:29

## 2023-08-26 NOTE — PROGRESS NOTE ADULT - SUBJECTIVE AND OBJECTIVE BOX
HPI  [place] 62[place] [facility] [place] dise[place] and dilated pancreatic duct  Pt was seen and examined at bedside. Denies of any HA. MRCP was done last night, result pending     Vital Signs Last 24 Hrs  T(C): 36.6 (26 Aug 2023 12:59), Max: 36.9 (25 Aug 2023 20:10)  T(F): 97.8 (26 Aug 2023 12:59), Max: 98.4 (25 Aug 2023 20:10)  HR: 79 (26 Aug 2023 12:59) (52 - 79)  BP: 158/88 (26 Aug 2023 12:59) (133/84 - 158/88)  BP(mean): --  RR: 17 (26 Aug 2023 12:59) (16 - 17)  SpO2: 98% (26 Aug 2023 12:59) (95% - 98%)    Parameters below as of 26 Aug 2023 12:59  Patient On (Oxygen Delivery Method): room air        I&O's Summary    25 Aug 2023 07:01  -  26 Aug 2023 07:00  --------------------------------------------------------  IN: 1305 mL / OUT: 0 mL / NET: 1305 mL        CAPILLARY BLOOD GLUCOSE      POCT Blood Glucose.: 114 mg/dL (26 Aug 2023 13:30)  POCT Blood Glucose.: 76 mg/dL (26 Aug 2023 12:13)  POCT Blood Glucose.: 87 mg/dL (26 Aug 2023 08:08)  POCT Blood Glucose.: 76 mg/dL (25 Aug 2023 21:11)  POCT Blood Glucose.: 109 mg/dL (25 Aug 2023 17:48)      PHYSICAL EXAM:  Constitutional: NAD, awake and alert, well-developed  HEENT: PERR, EOMI, Normal Hearing, MMM  Neck: Soft and supple, No LAD, No JVD  Respiratory: Breath sounds are clear bilaterally, No wheezing, rales or rhonchi  Cardiovascular: S1 and S2, regular rate and rhythm, no Murmurs, gallops or rubs  Gastrointestinal: Bowel Sounds present, soft, nontender, nondistended, no guarding, no rebound  Extremities: No peripheral edema  Vascular: 2+ peripheral pulses  Neurological: A/O x 3, no focal deficits  Musculoskeletal: 5/5 strength b/l upper and lower extremities  Skin: No rashes      MEDICATIONS:  MEDICATIONS  (STANDING):  clopidogrel Tablet 75 milliGRAM(s) Oral daily  famotidine    Tablet 20 milliGRAM(s) Oral two times a day  heparin   Injectable 5000 Unit(s) SubCutaneous every 12 hours  lactated ringers. 1000 milliLiter(s) (75 mL/Hr) IV Continuous <Continuous>  QUEtiapine 100 milliGRAM(s) Oral at bedtime  QUEtiapine 25 milliGRAM(s) Oral <User Schedule>  tamsulosin 0.4 milliGRAM(s) Oral at bedtime  traZODone 50 milliGRAM(s) Oral at bedtime      LABS: All Labs Reviewed:                        14.6   3.54  )-----------( 180      ( 26 Aug 2023 06:17 )             43.6     08-26    138  |  104  |  15.5  ----------------------------<  85  4.0   |  26.0  |  1.05    Ca    9.0      26 Aug 2023 06:17    TPro  5.6<L>  /  Alb  3.2<L>  /  TBili  0.6  /  DBili  0.2  /  AST  19  /  ALT  18  /  AlkPhos  53  08-25          Blood Culture:     RADIOLOGY/EKG:    DVT PPX:    ADVANCED DIRECTIVE:    DISPOSITION: HPI  [place] 62yo [place] [facility] [place] and dilated pancreatic duct  Pt was seen and examined at bedside. Denies of any HA. MRCP was done last night, result pending     Vital Signs Last 24 Hrs  T(C): 36.6 (26 Aug 2023 12:59), Max: 36.9 (25 Aug 2023 20:10)  T(F): 97.8 (26 Aug 2023 12:59), Max: 98.4 (25 Aug 2023 20:10)  HR: 79 (26 Aug 2023 12:59) (52 - 79)  BP: 158/88 (26 Aug 2023 12:59) (133/84 - 158/88)  BP(mean): --  RR: 17 (26 Aug 2023 12:59) (16 - 17)  SpO2: 98% (26 Aug 2023 12:59) (95% - 98%)    Parameters below as of 26 Aug 2023 12:59  Patient On (Oxygen Delivery Method): room air        I&O's Summary    25 Aug 2023 07:01  -  26 Aug 2023 07:00  --------------------------------------------------------  IN: 1305 mL / OUT: 0 mL / NET: 1305 mL        CAPILLARY BLOOD GLUCOSE      POCT Blood Glucose.: 114 mg/dL (26 Aug 2023 13:30)  POCT Blood Glucose.: 76 mg/dL (26 Aug 2023 12:13)  POCT Blood Glucose.: 87 mg/dL (26 Aug 2023 08:08)  POCT Blood Glucose.: 76 mg/dL (25 Aug 2023 21:11)  POCT Blood Glucose.: 109 mg/dL (25 Aug 2023 17:48)      PHYSICAL EXAM:  Constitutional: NAD, awake and alert, well-developed  HEENT: PERR, EOMI, Normal Hearing, MMM  Neck: Soft and supple, No LAD, No JVD  Respiratory: Breath sounds are clear bilaterally, No wheezing, rales or rhonchi  Cardiovascular: S1 and S2, regular rate and rhythm, no Murmurs, gallops or rubs  Gastrointestinal: Bowel Sounds present, soft, nontender, nondistended, no guarding, no rebound  Extremities: No peripheral edema  Vascular: 2+ peripheral pulses  Neurological: A/O x 3, no focal deficits  Musculoskeletal: 5/5 strength b/l upper and lower extremities  Skin: No rashes      MEDICATIONS:  MEDICATIONS  (STANDING):  clopidogrel Tablet 75 milliGRAM(s) Oral daily  famotidine    Tablet 20 milliGRAM(s) Oral two times a day  heparin   Injectable 5000 Unit(s) SubCutaneous every 12 hours  lactated ringers. 1000 milliLiter(s) (75 mL/Hr) IV Continuous <Continuous>  QUEtiapine 100 milliGRAM(s) Oral at bedtime  QUEtiapine 25 milliGRAM(s) Oral <User Schedule>  tamsulosin 0.4 milliGRAM(s) Oral at bedtime  traZODone 50 milliGRAM(s) Oral at bedtime      LABS: All Labs Reviewed:                        14.6   3.54  )-----------( 180      ( 26 Aug 2023 06:17 )             43.6     08-26    138  |  104  |  15.5  ----------------------------<  85  4.0   |  26.0  |  1.05    Ca    9.0      26 Aug 2023 06:17    TPro  5.6<L>  /  Alb  3.2<L>  /  TBili  0.6  /  DBili  0.2  /  AST  19  /  ALT  18  /  AlkPhos  53  08-25          Blood Culture:     RADIOLOGY/EKG:    DVT PPX:    ADVANCED DIRECTIVE:    DISPOSITION: HPI  Pt is a 62yo M [place] [facility] [place]CA disection and dilated pancreatic duct  Pt was seen and examined at bedside. Denies of any HA. MRCP was done last night, result pending     Vital Signs Last 24 Hrs  T(C): 36.6 (26 Aug 2023 12:59), Max: 36.9 (25 Aug 2023 20:10)  T(F): 97.8 (26 Aug 2023 12:59), Max: 98.4 (25 Aug 2023 20:10)  HR: 79 (26 Aug 2023 12:59) (52 - 79)  BP: 158/88 (26 Aug 2023 12:59) (133/84 - 158/88)  BP(mean): --  RR: 17 (26 Aug 2023 12:59) (16 - 17)  SpO2: 98% (26 Aug 2023 12:59) (95% - 98%)    Parameters below as of 26 Aug 2023 12:59  Patient On (Oxygen Delivery Method): room air        I&O's Summary    25 Aug 2023 07:01  -  26 Aug 2023 07:00  --------------------------------------------------------  IN: 1305 mL / OUT: 0 mL / NET: 1305 mL        CAPILLARY BLOOD GLUCOSE      POCT Blood Glucose.: 114 mg/dL (26 Aug 2023 13:30)  POCT Blood Glucose.: 76 mg/dL (26 Aug 2023 12:13)  POCT Blood Glucose.: 87 mg/dL (26 Aug 2023 08:08)  POCT Blood Glucose.: 76 mg/dL (25 Aug 2023 21:11)  POCT Blood Glucose.: 109 mg/dL (25 Aug 2023 17:48)      PHYSICAL EXAM:  Constitutional: NAD, awake and alert, well-developed  HEENT: PERR, EOMI, Normal Hearing, MMM  Neck: Soft and supple, No LAD, No JVD  Respiratory: Breath sounds are clear bilaterally, No wheezing, rales or rhonchi  Cardiovascular: S1 and S2, regular rate and rhythm, no Murmurs, gallops or rubs  Gastrointestinal: Bowel Sounds present, soft, nontender, nondistended, no guarding, no rebound  Extremities: No peripheral edema  Vascular: 2+ peripheral pulses  Neurological: A/O x 3, no focal deficits  Musculoskeletal: 5/5 strength b/l upper and lower extremities  Skin: No rashes      MEDICATIONS:  MEDICATIONS  (STANDING):  clopidogrel Tablet 75 milliGRAM(s) Oral daily  famotidine    Tablet 20 milliGRAM(s) Oral two times a day  heparin   Injectable 5000 Unit(s) SubCutaneous every 12 hours  lactated ringers. 1000 milliLiter(s) (75 mL/Hr) IV Continuous <Continuous>  QUEtiapine 100 milliGRAM(s) Oral at bedtime  QUEtiapine 25 milliGRAM(s) Oral <User Schedule>  tamsulosin 0.4 milliGRAM(s) Oral at bedtime  traZODone 50 milliGRAM(s) Oral at bedtime      LABS: All Labs Reviewed:                        14.6   3.54  )-----------( 180      ( 26 Aug 2023 06:17 )             43.6     08-26    138  |  104  |  15.5  ----------------------------<  85  4.0   |  26.0  |  1.05    Ca    9.0      26 Aug 2023 06:17    TPro  5.6<L>  /  Alb  3.2<L>  /  TBili  0.6  /  DBili  0.2  /  AST  19  /  ALT  18  /  AlkPhos  53  08-25          Blood Culture:     RADIOLOGY/EKG:    DVT PPX:    ADVANCED DIRECTIVE:    DISPOSITION:

## 2023-08-26 NOTE — PROGRESS NOTE ADULT - ASSESSMENT
63M with PMHX MDD, Anxiety, ?Bipolar, BPH, Tobacco/THC Abuse, ETOH Abuse s/p [facility] x[facility] days presents to [facility] [place] c/o HA and Blurry Vision admitted for R ICA Dissection also found to have episode of Hypoglycemia and Epigastric Pain/Dilated Pancreatic Duct.    *R ICA Dissection  NIHSS 0   Neurochecks q4   Goal SBP <140   Hydralazine 5mg IV PRN SBP >140. Avoid BB with Bradycardia.  ASA 325mg PO and Plavix 300mg x1 Loaded at [facility] [place]  Will DC ASA for now per neurosurg    Plavix 75mg q24 with Pepcid 20mg BID   No need for heparin gtt given symptoms >1 month duration per Neuro IR   VTE PPX SCD/SQH  Neuro IR consulted noted    *Bradycardia  Monitor Telemetry. Check EKG.  Hydralazine 5mg PRN SBP >140  Avoid labetalol with bradycardia  BP stable. Asymptomatic. Monitor VS q2    *Hypoglycemia  One episode at [facility] [place] resolved PTA    *Incidental Dilated Pancreatic Duct r/o Pancreatitis v Underlying Pathology  Seen on CTAP from [facility] [place] 8/23  LFTs/Lipase noted  GI consult appreciate it   MRCP done last night, result pending reading  GI called today, will eval pt tomorrow     *Incidental Nodular Opacity  1.1cm nodular opactiy on ct Abd/Pel  Outpatient F/u for repeat CT Chest on discharge    *MDD, Anxiety, ?Bipolar Disorder  Hydroxyzine 50mg BID PRN Anxiety  Quetiapine 25mg qAM + 100mg qPM  Trazodone 50mg qHS    *BPH  Flomax 0.4mg qHS    *ETOH Abuse, THC Abuse, Tobacco Abuse  s/p [facility]. Unclear if patient was on treatment taper.  No signs/symptoms of withdrawal at this time. Monitor closely in case repeat CIWA protocol required.   Cessation    Dispo: pending GI and final result of MRCP  63M with PMHX MDD, Anxiety, ?Bipolar, BPH, Tobacco/THC Abuse, ETOH Abuse s/p [facility] x9 days presents to [facility] c/o HA and Blurry Vision admitted for R ICA Dissection also found to have episode of Hypoglycemia and Epigastric Pain/Dilated Pancreatic Duct.    *R ICA Dissection  NIHSS 0   Neurochecks q4   Goal SBP <140   Hydralazine 5mg IV PRN SBP >140. Avoid BB with Bradycardia.  ASA 325mg PO and Plavix 300mg x1 Loaded at [facility]  Will DC ASA for now per neurosurg    Plavix 75mg q24 with Pepcid 20mg BID   No need for heparin gtt given symptoms >1 month duration per Neuro IR   VTE PPX SCD/SQH  Neuro IR consulted noted    *Bradycardia  Monitor Telemetry. Check EKG.  Hydralazine 5mg PRN SBP >140  Avoid labetalol with bradycardia  BP stable. Asymptomatic. Monitor VS q2    *Hypoglycemia  One episode at [facility] resolved PTA    *Incidental Dilated Pancreatic Duct r/o Pancreatitis v Underlying Pathology  Seen on CTAP from [facility] 8/23  LFTs/Lipase noted  GI consult appreciate it   MRCP done last night, result pending reading  GI called today, will eval pt tomorrow     *Incidental Nodular Opacity  1.1cm nodular opactiy on ct Abd/Pel  Outpatient F/u for repeat CT Chest on discharge    *MDD, Anxiety, ?Bipolar Disorder  Hydroxyzine 50mg BID PRN Anxiety  Quetiapine 25mg qAM + 100mg qPM  Trazodone 50mg qHS    *BPH  Flomax 0.4mg qHS    *ETOH Abuse, THC Abuse, Tobacco Abuse  s/p [facility]. Unclear if patient was on treatment taper.  No signs/symptoms of withdrawal at this time. Monitor closely in case repeat CIWA protocol required.   Cessation    Dispo: pending GI and final result of MRCP  63M with PMHX MDD, Anxiety, ?Bipolar, BPH, Tobacco/THC Abuse, ETOH Abuse s/p [facility] x9 days presents to [facility] [place] c/o HA and Blurry Vision admitted for R ICA Dissection also found to have episode of Hypoglycemia and Epigastric Pain/Dilated Pancreatic Duct.    *R ICA Dissection  NIHSS 0   Neurochecks q4   Goal SBP <140   Hydralazine 5mg IV PRN SBP >140. Avoid BB with Bradycardia.  ASA 325mg PO and Plavix 300mg x1 Loaded at [facility] [place]  Will DC ASA for now per neurosurg    Plavix 75mg q24 with Pepcid 20mg BID   No need for heparin gtt given symptoms >1 month duration per Neuro IR   VTE PPX SCD/SQH  Neuro IR consulted noted    *Bradycardia  Monitor Telemetry. Check EKG.  Hydralazine 5mg PRN SBP >140  Avoid labetalol with bradycardia  BP stable. Asymptomatic. Monitor VS q2    *Hypoglycemia  One episode at [facility] resolved PTA    *Incidental Dilated Pancreatic Duct r/o Pancreatitis v Underlying Pathology  Seen on CTAP from [facility] [place] 8/23  LFTs/Lipase noted  GI consult appreciate it   MRCP done last night, result pending reading  GI called today, will eval pt tomorrow     *Incidental Nodular Opacity  1.1cm nodular opactiy on ct Abd/Pel  Outpatient F/u for repeat CT Chest on discharge    *MDD, Anxiety, ?Bipolar Disorder  Hydroxyzine 50mg BID PRN Anxiety  Quetiapine 25mg qAM + 100mg qPM  Trazodone 50mg qHS    *BPH  Flomax 0.4mg qHS    *ETOH Abuse, THC Abuse, Tobacco Abuse  s/p [facility]. Unclear if patient was on treatment taper.  No signs/symptoms of withdrawal at this time. Monitor closely in case repeat CIWA protocol required.   Cessation    Dispo: pending GI and final result of MRCP

## 2023-08-27 VITALS
HEART RATE: 63 BPM | OXYGEN SATURATION: 96 % | DIASTOLIC BLOOD PRESSURE: 70 MMHG | TEMPERATURE: 98 F | RESPIRATION RATE: 18 BRPM | SYSTOLIC BLOOD PRESSURE: 124 MMHG

## 2023-08-27 DIAGNOSIS — K86.89 OTHER SPECIFIED DISEASES OF PANCREAS: ICD-10-CM

## 2023-08-27 LAB
ANION GAP SERPL CALC-SCNC: 10 MMOL/L — SIGNIFICANT CHANGE UP (ref 5–17)
BUN SERPL-MCNC: 17.3 MG/DL — SIGNIFICANT CHANGE UP (ref 8–20)
CALCIUM SERPL-MCNC: 9 MG/DL — SIGNIFICANT CHANGE UP (ref 8.4–10.5)
CHLORIDE SERPL-SCNC: 104 MMOL/L — SIGNIFICANT CHANGE UP (ref 96–108)
CO2 SERPL-SCNC: 26 MMOL/L — SIGNIFICANT CHANGE UP (ref 22–29)
CREAT SERPL-MCNC: 1.04 MG/DL — SIGNIFICANT CHANGE UP (ref 0.5–1.3)
EGFR: 80 ML/MIN/1.73M2 — SIGNIFICANT CHANGE UP
GLUCOSE SERPL-MCNC: 84 MG/DL — SIGNIFICANT CHANGE UP (ref 70–99)
HCT VFR BLD CALC: 43.1 % — SIGNIFICANT CHANGE UP (ref 39–50)
HGB BLD-MCNC: 14.8 G/DL — SIGNIFICANT CHANGE UP (ref 13–17)
MCHC RBC-ENTMCNC: 31.2 PG — SIGNIFICANT CHANGE UP (ref 27–34)
MCHC RBC-ENTMCNC: 34.3 GM/DL — SIGNIFICANT CHANGE UP (ref 32–36)
MCV RBC AUTO: 90.7 FL — SIGNIFICANT CHANGE UP (ref 80–100)
PLATELET # BLD AUTO: 188 K/UL — SIGNIFICANT CHANGE UP (ref 150–400)
POTASSIUM SERPL-MCNC: 4.1 MMOL/L — SIGNIFICANT CHANGE UP (ref 3.5–5.3)
POTASSIUM SERPL-SCNC: 4.1 MMOL/L — SIGNIFICANT CHANGE UP (ref 3.5–5.3)
RBC # BLD: 4.75 M/UL — SIGNIFICANT CHANGE UP (ref 4.2–5.8)
RBC # FLD: 13.1 % — SIGNIFICANT CHANGE UP (ref 10.3–14.5)
SODIUM SERPL-SCNC: 140 MMOL/L — SIGNIFICANT CHANGE UP (ref 135–145)
WBC # BLD: 4.09 K/UL — SIGNIFICANT CHANGE UP (ref 3.8–10.5)
WBC # FLD AUTO: 4.09 K/UL — SIGNIFICANT CHANGE UP (ref 3.8–10.5)

## 2023-08-27 PROCEDURE — 83036 HEMOGLOBIN GLYCOSYLATED A1C: CPT

## 2023-08-27 PROCEDURE — 80053 COMPREHEN METABOLIC PANEL: CPT

## 2023-08-27 PROCEDURE — 86301 IMMUNOASSAY TUMOR CA 19-9: CPT

## 2023-08-27 PROCEDURE — 93892 TCD EMBOLI DETECT W/O INJ: CPT

## 2023-08-27 PROCEDURE — 80048 BASIC METABOLIC PNL TOTAL CA: CPT

## 2023-08-27 PROCEDURE — 36415 COLL VENOUS BLD VENIPUNCTURE: CPT

## 2023-08-27 PROCEDURE — 82962 GLUCOSE BLOOD TEST: CPT

## 2023-08-27 PROCEDURE — 99231 SBSQ HOSP IP/OBS SF/LOW 25: CPT

## 2023-08-27 PROCEDURE — 83735 ASSAY OF MAGNESIUM: CPT

## 2023-08-27 PROCEDURE — 96374 THER/PROPH/DIAG INJ IV PUSH: CPT

## 2023-08-27 PROCEDURE — 82248 BILIRUBIN DIRECT: CPT

## 2023-08-27 PROCEDURE — 84100 ASSAY OF PHOSPHORUS: CPT

## 2023-08-27 PROCEDURE — 82247 BILIRUBIN TOTAL: CPT

## 2023-08-27 PROCEDURE — 99239 HOSP IP/OBS DSCHRG MGMT >30: CPT

## 2023-08-27 PROCEDURE — 85025 COMPLETE CBC W/AUTO DIFF WBC: CPT

## 2023-08-27 PROCEDURE — 85027 COMPLETE CBC AUTOMATED: CPT

## 2023-08-27 PROCEDURE — 86803 HEPATITIS C AB TEST: CPT

## 2023-08-27 PROCEDURE — 80061 LIPID PANEL: CPT

## 2023-08-27 PROCEDURE — 83690 ASSAY OF LIPASE: CPT

## 2023-08-27 PROCEDURE — 74181 MRI ABDOMEN W/O CONTRAST: CPT

## 2023-08-27 PROCEDURE — 96375 TX/PRO/DX INJ NEW DRUG ADDON: CPT

## 2023-08-27 PROCEDURE — 99285 EMERGENCY DEPT VISIT HI MDM: CPT | Mod: 23

## 2023-08-27 RX ORDER — CLOPIDOGREL BISULFATE 75 MG/1
1 TABLET, FILM COATED ORAL
Qty: 30 | Refills: 0
Start: 2023-08-27 | End: 2023-09-25

## 2023-08-27 RX ADMIN — Medication 650 MILLIGRAM(S): at 08:56

## 2023-08-27 RX ADMIN — Medication 650 MILLIGRAM(S): at 07:56

## 2023-08-27 RX ADMIN — QUETIAPINE FUMARATE 25 MILLIGRAM(S): 200 TABLET, FILM COATED ORAL at 06:07

## 2023-08-27 RX ADMIN — FAMOTIDINE 20 MILLIGRAM(S): 10 INJECTION INTRAVENOUS at 06:05

## 2023-08-27 RX ADMIN — CLOPIDOGREL BISULFATE 75 MILLIGRAM(S): 75 TABLET, FILM COATED ORAL at 07:49

## 2023-08-27 NOTE — PROGRESS NOTE ADULT - SUBJECTIVE AND OBJECTIVE BOX
Patient is a 63y old  Male who presents with a chief complaint of R ICA Dissection (26 Aug 2023 14:36)      HPI:    63M with PMHX MDD, Anxiety, ?Bipolar, BPH, Tobacco/THC Abuse, ETOH Abuse s/p [place] [facility] x9 days presents to [facility] c/o HA and Blurry Vision. CTH negative. CTA Head/Neck concerning for possible R ICA Dissection. MRI Brain, MRA Head/Neck performed at [facility] confirming R ICA Dissection. Patient loaded with [place]SA[place]av[place] [facility] and transferred to [facility] for Neuro IR eval. Also noted to have episode of hypoglycemia at [facility] which resolved and was also c/o epigastric abd pain with nausea for which CT ABD/PEL revealed dilated pancreatic duct. No current abd pain or N/V/D. Denies F/C. HA and Blurry vision improving. No weakness/dizziness. [facility] ED and [facility] ED discussed case with [facility] MICU and [facility] Neuro IR. No other complaints.      24 hour events:  States feels well "waiting on MRI result"  Appetite good      ROS negative unless mentioned.    PMHX: MDD, ?Bipolar, BPH, Tobacco/THC Abuse, ETOH Abuse   PSHX: None  Social Hx: +ETOH Abuse, THC Abuse, Tobacco Abuse  FamHx: +FamHx Brain Aneurysm reported (23 Aug 2023 23:50)      REVIEW OF SYSTEMS:  Constitutional: No fever, weight loss or fatigue  ENMT:  No difficulty hearing, tinnitus, vertigo; No sinus or throat pain  Respiratory: No cough, wheezing, chills or hemoptysis  Cardiovascular: No chest pain, palpitations, dizziness or leg swelling  Gastrointestinal: No abdominal or epigastric pain. No nausea, vomiting or hematemesis; No diarrhea or constipation. No melena or hematochezia.  Skin: No itching, burning, rashes or lesions   Musculoskeletal: No joint pain or swelling; No muscle, back or extremity pain    PAST MEDICAL & SURGICAL HISTORY:  Anxiety          FAMILY HISTORY:        MEDICATIONS:  MEDICATIONS  (STANDING):  clopidogrel Tablet 75 milliGRAM(s) Oral daily  famotidine    Tablet 20 milliGRAM(s) Oral two times a day  heparin   Injectable 5000 Unit(s) SubCutaneous every 12 hours  QUEtiapine 25 milliGRAM(s) Oral <User Schedule>  QUEtiapine 100 milliGRAM(s) Oral at bedtime  tamsulosin 0.4 milliGRAM(s) Oral at bedtime  traZODone 50 milliGRAM(s) Oral at bedtime    MEDICATIONS  (PRN):  acetaminophen     Tablet .. 650 milliGRAM(s) Oral every 6 hours PRN Temp greater or equal to 38C (100.4F), Mild Pain (1 - 3)  aluminum hydroxide/magnesium hydroxide/simethicone Suspension 30 milliLiter(s) Oral every 4 hours PRN Dyspepsia  hydrALAZINE Injectable 5 milliGRAM(s) IV Push every 4 hours PRN SBP > 140  hydrOXYzine hydrochloride 50 milliGRAM(s) Oral two times a day PRN Anxiety  melatonin 3 milliGRAM(s) Oral at bedtime PRN Insomnia  ondansetron Injectable 4 milliGRAM(s) IV Push every 8 hours PRN Nausea and/or Vomiting      Allergies    Allergy Status Unknown    Intolerances        Vital Signs Last 24 Hrs  T(C): 36.7 (27 Aug 2023 07:55), Max: 36.8 (26 Aug 2023 20:35)  T(F): 98 (27 Aug 2023 07:55), Max: 98.3 (26 Aug 2023 20:35)  HR: 62 (27 Aug 2023 07:55) (54 - 79)  BP: 112/62 (27 Aug 2023 07:55) (102/66 - 158/88)  BP(mean): --  RR: 18 (27 Aug 2023 07:55) (17 - 18)  SpO2: 95% (27 Aug 2023 07:55) (94% - 99%)    Parameters below as of 27 Aug 2023 07:55  Patient On (Oxygen Delivery Method): room air        08-26 @ 07:01  -  08-27 @ 07:00  --------------------------------------------------------  IN: 700 mL / OUT: 0 mL / NET: 700 mL          PHYSICAL EXAM:    General:  in no acute distress  HEENT: MMM, conjunctiva and sclera clear  Gastrointestinal: Soft, non-tender non-distended; Normal bowel sounds;   No rebound or guarding Reports normal BM this am  Extremities: Normal range of motion, No clubbing, cyanosis or edema  Neurological: Alert and oriented x3  Skin: Warm and dry. No obvious rash      LABS:                        14.8   4.09  )-----------( 188      ( 27 Aug 2023 05:26 )             43.1     27 Aug 2023 05:26    140    |  104    |  17.3   ----------------------------<  84     4.1     |  26.0   |  1.04     Ca    9.0        27 Aug 2023 05:26                   Patient is a 63y old  Male who presents with a chief complaint of R ICA Dissection (26 Aug 2023 14:36)      HPI:    63M with PMHX MDD, Anxiety, ?Bipolar, BPH, Tobacco/THC Abuse, ETOH Abuse s/p [place] [facility] x9 days presents to [facility] c/o HA and Blurry Vision. CTH negative. CTA Head/Neck concerning for possible R ICA Dissection. MRI Brain, MRA Head/Neck performed at [facility] confirming R ICA Dissection. Patient loaded with ASA/Plavix at [facility] and transferred to [facility] for Neuro IR eval. Also noted to have episode of hypoglycemia at [facility] which resolved and was also c/o epigastric abd pain with nausea for which CT ABD/PEL revealed dilated pancreatic duct. No current abd pain or N/V/D. Denies F/C. HA and Blurry vision improving. No weakness/dizziness. [facility] ED and [facility] ED discussed case with [facility] MICU and [facility] Neuro IR. No other complaints.      24 hour events:  States feels well "waiting on MRI result"  Appetite good      ROS negative unless mentioned.    PMHX: MDD, ?Bipolar, BPH, Tobacco/THC Abuse, ETOH Abuse   PSHX: None  Social Hx: +ETOH Abuse, THC Abuse, Tobacco Abuse  FamHx: +FamHx Brain Aneurysm reported (23 Aug 2023 23:50)      REVIEW OF SYSTEMS:  Constitutional: No fever, weight loss or fatigue  ENMT:  No difficulty hearing, tinnitus, vertigo; No sinus or throat pain  Respiratory: No cough, wheezing, chills or hemoptysis  Cardiovascular: No chest pain, palpitations, dizziness or leg swelling  Gastrointestinal: No abdominal or epigastric pain. No nausea, vomiting or hematemesis; No diarrhea or constipation. No melena or hematochezia.  Skin: No itching, burning, rashes or lesions   Musculoskeletal: No joint pain or swelling; No muscle, back or extremity pain    PAST MEDICAL & SURGICAL HISTORY:  Anxiety          FAMILY HISTORY:        MEDICATIONS:  MEDICATIONS  (STANDING):  clopidogrel Tablet 75 milliGRAM(s) Oral daily  famotidine    Tablet 20 milliGRAM(s) Oral two times a day  heparin   Injectable 5000 Unit(s) SubCutaneous every 12 hours  QUEtiapine 25 milliGRAM(s) Oral <User Schedule>  QUEtiapine 100 milliGRAM(s) Oral at bedtime  tamsulosin 0.4 milliGRAM(s) Oral at bedtime  traZODone 50 milliGRAM(s) Oral at bedtime    MEDICATIONS  (PRN):  acetaminophen     Tablet .. 650 milliGRAM(s) Oral every 6 hours PRN Temp greater or equal to 38C (100.4F), Mild Pain (1 - 3)  aluminum hydroxide/magnesium hydroxide/simethicone Suspension 30 milliLiter(s) Oral every 4 hours PRN Dyspepsia  hydrALAZINE Injectable 5 milliGRAM(s) IV Push every 4 hours PRN SBP > 140  hydrOXYzine hydrochloride 50 milliGRAM(s) Oral two times a day PRN Anxiety  melatonin 3 milliGRAM(s) Oral at bedtime PRN Insomnia  ondansetron Injectable 4 milliGRAM(s) IV Push every 8 hours PRN Nausea and/or Vomiting      Allergies    Allergy Status Unknown    Intolerances        Vital Signs Last 24 Hrs  T(C): 36.7 (27 Aug 2023 07:55), Max: 36.8 (26 Aug 2023 20:35)  T(F): 98 (27 Aug 2023 07:55), Max: 98.3 (26 Aug 2023 20:35)  HR: 62 (27 Aug 2023 07:55) (54 - 79)  BP: 112/62 (27 Aug 2023 07:55) (102/66 - 158/88)  BP(mean): --  RR: 18 (27 Aug 2023 07:55) (17 - 18)  SpO2: 95% (27 Aug 2023 07:55) (94% - 99%)    Parameters below as of 27 Aug 2023 07:55  Patient On (Oxygen Delivery Method): room air        08-26 @ 07:01  -  08-27 @ 07:00  --------------------------------------------------------  IN: 700 mL / OUT: 0 mL / NET: 700 mL          PHYSICAL EXAM:    General:  in no acute distress  HEENT: MMM, conjunctiva and sclera clear  Gastrointestinal: Soft, non-tender non-distended; Normal bowel sounds;   No rebound or guarding Reports normal BM this am  Extremities: Normal range of motion, No clubbing, cyanosis or edema  Neurological: Alert and oriented x3  Skin: Warm and dry. No obvious rash      LABS:                        14.8   4.09  )-----------( 188      ( 27 Aug 2023 05:26 )             43.1     27 Aug 2023 05:26    140    |  104    |  17.3   ----------------------------<  84     4.1     |  26.0   |  1.04     Ca    9.0        27 Aug 2023 05:26                   Patient is a 63y old  Male who presents with a chief complaint of R ICA Dissection (26 Aug 2023 14:36)      HPI:    63M with PMHX MDD, Anxiety, ?Bipolar, BPH, Tobacco/THC Abuse, ETOH Abuse s/p [place] [facility] x9 days presents to [facility] c/o HA and Blurry Vision. CTH negative. CTA Head/Neck concerning for possible R ICA Dissection. MRI Brain, MRA Head/Neck performed at [facility] confirming R ICA Dissection. Patient loaded with ASA/Plavix at [facility] and transferred to [facility] for Neuro IR eval. Also noted to have episode of hypoglycemia at [facility] which resolved and was also c/o epigastric abd pain with nausea for which CT ABD/PEL revealed dilated pancreatic duct. No current abd pain or N/V/D. Denies F/C. HA and Blurry vision improving. No weakness/dizziness. [facility] ED and [facility] ED discussed case with [facility] MICU and [facility] Neuro IR. No other complaints.      24 hour events:  States feels well "waiting on MRI result"  Appetite good      ROS negative unless mentioned.    PMHX: MDD, ?Bipolar, BPH, Tobacco/THC Abuse, ETOH Abuse   PSHX: None  Social Hx: +ETOH Abuse, THC Abuse, Tobacco Abuse  FamHx: +FamHx Brain Aneurysm reported (23 Aug 2023 23:50)      REVIEW OF SYSTEMS:  Constitutional: No fever, weight loss or fatigue  ENMT:  No difficulty hearing, tinnitus, vertigo; No sinus or throat pain  Respiratory: No cough, wheezing, chills or hemoptysis  Cardiovascular: No chest pain, palpitations, dizziness or leg swelling  Gastrointestinal: No abdominal or epigastric pain. No nausea, vomiting or hematemesis; No diarrhea or constipation. No melena or hematochezia.  Skin: No itching, burning, rashes or lesions   Musculoskeletal: No joint pain or swelling; No muscle, back or extremity pain    PAST MEDICAL & SURGICAL HISTORY:  Anxiety          FAMILY HISTORY:        MEDICATIONS:  MEDICATIONS  (STANDING):  clopidogrel Tablet 75 milliGRAM(s) Oral daily  famotidine    Tablet 20 milliGRAM(s) Oral two times a day  heparin   Injectable 5000 Unit(s) SubCutaneous every 12 hours  QUEtiapine 25 milliGRAM(s) Oral <User Schedule>  QUEtiapine 100 milliGRAM(s) Oral at bedtime  tamsulosin 0.4 milliGRAM(s) Oral at bedtime  traZODone 50 milliGRAM(s) Oral at bedtime    MEDICATIONS  (PRN):  acetaminophen     Tablet .. 650 milliGRAM(s) Oral every 6 hours PRN Temp greater or equal to 38C (100.4F), Mild Pain (1 - 3)  aluminum hydroxide/magnesium hydroxide/simethicone Suspension 30 milliLiter(s) Oral every 4 hours PRN Dyspepsia  hydrALAZINE Injectable 5 milliGRAM(s) IV Push every 4 hours PRN SBP > 140  hydrOXYzine hydrochloride 50 milliGRAM(s) Oral two times a day PRN Anxiety  melatonin 3 milliGRAM(s) Oral at bedtime PRN Insomnia  ondansetron Injectable 4 milliGRAM(s) IV Push every 8 hours PRN Nausea and/or Vomiting      Allergies    Allergy Status Unknown    Intolerances        Vital Signs Last 24 Hrs  T(C): 36.7 (27 Aug 2023 07:55), Max: 36.8 (26 Aug 2023 20:35)  T(F): 98 (27 Aug 2023 07:55), Max: 98.3 (26 Aug 2023 20:35)  HR: 62 (27 Aug 2023 07:55) (54 - 79)  BP: 112/62 (27 Aug 2023 07:55) (102/66 - 158/88)  BP(mean): --  RR: 18 (27 Aug 2023 07:55) (17 - 18)  SpO2: 95% (27 Aug 2023 07:55) (94% - 99%)    Parameters below as of 27 Aug 2023 07:55  Patient On (Oxygen Delivery Method): room air        08-26 @ 07:01  -  08-27 @ 07:00  --------------------------------------------------------  IN: 700 mL / OUT: 0 mL / NET: 700 mL          PHYSICAL EXAM:    General:  in no acute distress  HEENT: MMM, conjunctiva and sclera clear  Gastrointestinal: Soft, non-tender non-distended; Normal bowel sounds;   No rebound or guarding Reports normal BM this am  Extremities: Normal range of motion, No clubbing, cyanosis or edema  Neurological: Alert and oriented x3  Skin: Warm and dry. No obvious rash      LABS:                        14.8   4.09  )-----------( 188      ( 27 Aug 2023 05:26 )             43.1     27 Aug 2023 05:26    140    |  104    |  17.3   ----------------------------<  84     4.1     |  26.0   |  1.04     Ca    9.0        27 Aug 2023 05:26

## 2023-08-27 NOTE — SBIRT NOTE ADULT - NSSBIRTDRGNOACTINTDET_GEN_A_CORE
Patient notes he does not do drugs at this time. Patient notes he was sober for several years and recently relapsed. Patient notes he went to detox and rehab and notes he would like to return inpatient to continue his sobriety. Patient notes should he note able to return to [facility], he would like to follow up outpatient  Patient notes he does not do drugs at this time. Patient notes he was sober for several years and recently relapsed. Patient notes he went to detox and rehab and notes he would like to return inpatient to continue his sobriety. Patient notes should he note able to return to [facility], he would like to follow up outpatient  Patient notes he does not do drugs at this time. Patient notes he was sober for several years and recently relapsed. Patient notes he went to detox and rehab and notes he would like to return inpatient to continue his sobriety. Patient notes should he note able to return to [facility], he would like to follow up outpatient

## 2023-08-27 NOTE — PROGRESS NOTE ADULT - ASSESSMENT
63y old man with a past medical history significant for polysubstance abuse who was transferred for evaluation of R ICA dissection after he reported daily headaches at rehab. GI was consulted for incidental finding of dilated PD.

## 2023-08-27 NOTE — DISCHARGE NOTE PROVIDER - HOSPITAL COURSE
ICA 63M with PMHX MDD, Anxiety, BPH, Tobacco/THC Abuse, ETOH Abuse s/p [place] [facility] x9 days [place] [facility] [place] and Blurry Vision. CTH negative. CTA Head/Neck concerning for possible R ICA Dissection. MRI Brain, MRA Head[place]Ne[place] [facility] [place] R ICA Dissection. [place] [facility] [place] [facility] for Neuro IR eval. Patient was seen by neurology and aspirin was discontinued but he was continued on plavix. his headache and blurry vision resolved. There was incidental finding of dilated pancreatic duct. He denied weight loss, nausea, vomiting, abdominal pain or diarrhea. MRCP was done and did not reveal any biliary obstruction and showed the finding of dilated pancreatic duct. Patient is being discharged today.    63M with PMHX MDD, Anxiety, BPH, Tobacco/THC Abuse, ETOH Abuse s/p [place] [facility] x9 days [place] [facility] [place] and Blurry Vision. CTH negative. CTA Head/Neck concerning for possible R ICA Dissection. MRI Brain, MRA Head/[place] [facility] [place] R ICA Dissection. Patient loaded with [place]SA/Plavix at [facility] and transferred to [facility] for Neuro IR eval. Patient was seen by neurology and aspirin was discontinued but he was continued on plavix. his headache and blurry vision resolved. There was incidental finding of dilated pancreatic duct. He denied weight loss, nausea, vomiting, abdominal pain or diarrhea. MRCP was done and did not reveal any biliary obstruction and showed the finding of dilated pancreatic duct. Patient is being discharged today.    63M with PMHX MDD, Anxiety, BPH, Tobacco/THC Abuse, ETOH Abuse s/p [place] [facility] x9 days [place] [facility] [place]/o HA and Blurry Vision. CTH negative. CTA Head/Neck concerning for possible R ICA Dissection. MRI Brain, MRA Head/Neck performed at [facility] confirming R ICA Dissection. Patient loaded with ASA/Plavix at [facility] and transferred to [facility] for Neuro IR eval. Patient was seen by neurology and aspirin was discontinued but he was continued on plavix. his headache and blurry vision resolved. There was incidental finding of dilated pancreatic duct. He denied weight loss, nausea, vomiting, abdominal pain or diarrhea. MRCP was done and did not reveal any biliary obstruction and showed the finding of dilated pancreatic duct. Patient is being discharged today.

## 2023-08-27 NOTE — DISCHARGE NOTE PROVIDER - NSDCMRMEDTOKEN_GEN_ALL_CORE_FT
clopidogrel 75 mg oral tablet: 1 tab(s) orally once a day  hydrOXYzine pamoate 50 mg oral capsule: 1 cap(s) orally 2 times a day as needed for  anxiety  QUEtiapine 100 mg oral tablet: 1 tab(s) orally once a day (at bedtime)  QUEtiapine 25 mg oral tablet: 1 tab(s) orally once a day (in the morning)  tamsulosin 0.4 mg oral capsule: 1 cap(s) orally once a day (at bedtime)  traZODone 50 mg oral tablet: 1 tab(s) orally once a day (at bedtime)

## 2023-08-27 NOTE — SBIRT NOTE ADULT - NSSBIRTALCPOSREINDET_GEN_A_CORE
Patient notes he does not drink at this time. Patient notes he was sober for several years and recently relapsed. Patient notes he went to detox and rehab and notes he would like to return inpatient to continue his sobriety. Patient notes should he note able to return to [facility], he would like to follow up outpatient  Patient notes he does not drink at this time. Patient notes he was sober for several years and recently relapsed. Patient notes he went to detox and rehab and notes he would like to return inpatient to continue his sobriety. Patient notes should he note able to return to [facility], he would like to follow up outpatient  Patient notes he does not drink at this time. Patient notes he was sober for several years and recently relapsed. Patient notes he went to detox and rehab and notes he would like to return inpatient to continue his sobriety. Patient notes should he note able to return to [facility], he would like to follow up outpatient

## 2023-08-27 NOTE — DISCHARGE NOTE PROVIDER - ATTENDING DISCHARGE PHYSICAL EXAMINATION:
General: no acute distress  HEENT: normocephalic, atraumatic  Lungs: clear to auscultation  CVS: RRR. S1, S2.   CNS: AAOx3. no focal deficit

## 2023-08-27 NOTE — DISCHARGE NOTE NURSING/CASE MANAGEMENT/SOCIAL WORK - NSDCPEFALRISK_GEN_ALL_CORE
For information on Fall & Injury Prevention, visit: [email or web address]-mobility OR  [email or web address]-[email or web address]-tips-[email or web address]-avoid-injury OR  https://www.cdc.gov/steadi/patient.html For information on Fall & Injury Prevention, visit: [email or web address]://www.[email or web address]/[email or web address]-[email or web address]-health-[email or web address]-mobility OR  [email or web address]://www.[email or web address]/news/fall-prevention-tips-to-avoid-injury OR  https://www.cdc.gov/steadi/patient.html For information on Fall & Injury Prevention, visit: [email or web address]://www.[facility]/news/fall-prevention-protects-[email or web address]-maintains-health-[email or web address]-mobility OR  https://www.[facility]/news/fall-prevention-tips-to-avoid-injury OR  https://www.cdc.gov/steadi/patient.html

## 2023-08-27 NOTE — PROGRESS NOTE ADULT - PROBLEM SELECTOR PLAN 1
Prominence of the pancreas with mild dilatation of the main pancreatic duct measures up to 5 mm.  Rule out malignancy, chronic pancreatitis or main duct IPMN  Completed  MRI/MRCP with and without contrast< await results    Ca 19-9 > 17  WNL

## 2023-08-27 NOTE — DISCHARGE NOTE NURSING/CASE MANAGEMENT/SOCIAL WORK - PATIENT PORTAL LINK FT
You can access the FollowMyHealth Patient Portal offered by [facility] by registering at the following website: [email or web address]. By joining [employer]’s FollowMyHealth portal, you will also be able to view your health information using other applications (apps) compatible with our system. You can access the FollowMyHealth Patient Portal offered by [facility] by registering at the following website: [email or web address]. By joining [employer]’s FollowMyHealth portal, you will also be able to view your health information using other applications (apps) compatible with our system. You can access the FollowMyHealth Patient Portal offered by [facility] by registering at the following website: [email or web address]. By joining [employer]’s FollowMyHealth portal, you will also be able to view your health information using other applications (apps) compatible with our system.

## 2023-08-27 NOTE — DISCHARGE NOTE PROVIDER - NSDCCPCAREPLAN_GEN_ALL_CORE_FT
PRINCIPAL DISCHARGE DIAGNOSIS  Diagnosis: Carotid artery dissection  Assessment and Plan of Treatment:

## 2023-08-27 NOTE — PROGRESS NOTE ADULT - NS ATTEND AMEND GEN_ALL_CORE FT
Patient seen and examined.  Patient with no complaints today.  1.  Dilated pancreatic duct: MRI has been performed.  Official read has been pending.  No GI complaints at this time.  CA 19-9 level has been normal.  Most likely incidental finding.  Based on MRI results, further work-up as outpatient with Dr. Carina Whitt.
Agree with PA's assessment plan.

## 2024-01-05 RX ORDER — TRAZODONE HCL 50 MG
1 TABLET ORAL
Refills: 0 | DISCHARGE

## 2024-01-05 RX ORDER — HYDROXYZINE HCL 10 MG
1 TABLET ORAL
Refills: 0 | DISCHARGE

## 2024-01-05 RX ORDER — TAMSULOSIN HYDROCHLORIDE 0.4 MG/1
1 CAPSULE ORAL
Refills: 0 | DISCHARGE

## 2024-01-05 RX ORDER — QUETIAPINE FUMARATE 200 MG/1
1 TABLET, FILM COATED ORAL
Refills: 0 | DISCHARGE

## 2025-05-01 NOTE — ED ADULT NURSE REASSESSMENT NOTE - NIH STROKE SCALE: 6A. MOTOR LEG, LEFT, QM
END OF PROCEDURE MONITORING CRITERIA  01. BP is within +/- 20% of preprocedure  02. Oxygen sat is at 92% or above on RA, or existing order for O2 treatment, or at pre-sedation levels, otherwise new O2 order needed.  03. Unless the patient has a pre-procedure history of diminished level of consciousness, s/he is easily arousable and when aroused is able to responds appropriately for his/her age.  04. Significant complications related to the specific procedure are absent, have been controlled, or have been evaluated, including:      A. Pain.      B. Wound drainage.      C. All drains and tubes are patent.      D. Nausea and Vomiting. Vomiting is not persisten and has not occurred within 15 minutes prior to discharge.      E. Bladder distention. Voided bladder and/or no symptoms or urinary retention (e.g., bladder distention, frequent voiding in small amounts).      F. Neurovascular status.      G. Level of Consciousness consistent with pre procedural status.        spouse  affirmed that they were driving the patient home.    (0) No drift; leg holds 30 degree position for full 5 secs